# Patient Record
Sex: FEMALE | Race: WHITE | NOT HISPANIC OR LATINO | Employment: FULL TIME | URBAN - METROPOLITAN AREA
[De-identification: names, ages, dates, MRNs, and addresses within clinical notes are randomized per-mention and may not be internally consistent; named-entity substitution may affect disease eponyms.]

---

## 2023-02-01 PROBLEM — M25.562 CHRONIC PAIN OF BOTH KNEES: Status: ACTIVE | Noted: 2023-02-01

## 2023-02-01 PROBLEM — M06.4 POLYARTHRITIS, INFLAMMATORY (MULTI): Status: ACTIVE | Noted: 2023-02-01
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
## 2023-02-01 PROBLEM — H91.93 DECREASED HEARING OF BOTH EARS: Status: ACTIVE | Noted: 2023-02-01

## 2023-02-01 PROBLEM — I10 HYPERTENSION, BENIGN: Status: ACTIVE | Noted: 2023-02-01

## 2023-02-01 PROBLEM — R29.818 SUSPECTED SLEEP APNEA: Status: ACTIVE | Noted: 2023-02-01

## 2023-02-01 PROBLEM — M12.9 ARTHROPATHY: Status: ACTIVE | Noted: 2023-02-01

## 2023-02-01 PROBLEM — E66.813 CLASS 3 SEVERE OBESITY DUE TO EXCESS CALORIES WITH SERIOUS COMORBIDITY AND BODY MASS INDEX (BMI) OF 60.0 TO 69.9 IN ADULT: Status: ACTIVE | Noted: 2023-02-01

## 2023-02-01 PROBLEM — M54.50 LOW BACK PAIN: Status: ACTIVE | Noted: 2023-02-01

## 2023-02-01 PROBLEM — R74.8 ALKALINE PHOSPHATASE ELEVATION: Status: ACTIVE | Noted: 2023-02-01

## 2023-02-01 PROBLEM — G56.01 CARPAL TUNNEL SYNDROME OF RIGHT WRIST: Status: ACTIVE | Noted: 2023-02-01

## 2023-02-01 PROBLEM — M75.102 LEFT ROTATOR CUFF TEAR ARTHROPATHY: Status: ACTIVE | Noted: 2023-02-01

## 2023-02-01 PROBLEM — M79.602 PAIN IN BOTH UPPER EXTREMITIES: Status: ACTIVE | Noted: 2023-02-01

## 2023-02-01 PROBLEM — G89.29 CHRONIC PAIN OF BOTH KNEES: Status: ACTIVE | Noted: 2023-02-01

## 2023-02-01 PROBLEM — M10.9 GOUT: Status: ACTIVE | Noted: 2023-02-01

## 2023-02-01 PROBLEM — K57.30 DIVERTICULOSIS OF COLON: Status: ACTIVE | Noted: 2023-02-01

## 2023-02-01 PROBLEM — M25.569 KNEE PAIN: Status: ACTIVE | Noted: 2023-02-01

## 2023-02-01 PROBLEM — R79.89 LOW SERUM VITAMIN D: Status: ACTIVE | Noted: 2023-02-01

## 2023-02-01 PROBLEM — E66.01 CLASS 3 SEVERE OBESITY DUE TO EXCESS CALORIES WITH SERIOUS COMORBIDITY AND BODY MASS INDEX (BMI) OF 60.0 TO 69.9 IN ADULT (MULTI): Status: ACTIVE | Noted: 2023-02-01

## 2023-02-01 PROBLEM — M79.601 PAIN IN BOTH UPPER EXTREMITIES: Status: ACTIVE | Noted: 2023-02-01

## 2023-02-01 PROBLEM — E78.1 ENDOGENOUS HYPERLIPIDEMIA: Status: ACTIVE | Noted: 2023-02-01

## 2023-02-01 PROBLEM — M06.09 SERONEGATIVE ARTHROPATHY OF MULTIPLE SITES (MULTI): Status: ACTIVE | Noted: 2023-02-01

## 2023-02-01 PROBLEM — M12.812 LEFT ROTATOR CUFF TEAR ARTHROPATHY: Status: ACTIVE | Noted: 2023-02-01

## 2023-02-01 PROBLEM — E66.01 MORBID OBESITY WITH BMI OF 60.0-69.9, ADULT (MULTI): Status: ACTIVE | Noted: 2023-02-01

## 2023-02-01 PROBLEM — G56.02 CARPAL TUNNEL SYNDROME OF LEFT WRIST: Status: ACTIVE | Noted: 2023-02-01

## 2023-02-01 PROBLEM — M25.512 LEFT SHOULDER PAIN: Status: ACTIVE | Noted: 2023-02-01

## 2023-02-01 PROBLEM — E79.0 HYPERURICEMIA: Status: ACTIVE | Noted: 2023-02-01

## 2023-02-01 PROBLEM — K76.0 NAFLD (NONALCOHOLIC FATTY LIVER DISEASE): Status: ACTIVE | Noted: 2023-02-01

## 2023-02-01 PROBLEM — M25.561 CHRONIC PAIN OF BOTH KNEES: Status: ACTIVE | Noted: 2023-02-01

## 2023-02-01 PROBLEM — M17.9 DJD (DEGENERATIVE JOINT DISEASE) OF KNEE: Status: ACTIVE | Noted: 2023-02-01

## 2023-02-01 PROBLEM — E55.9 VITAMIN D DEFICIENCY: Status: ACTIVE | Noted: 2023-02-01

## 2023-02-01 RX ORDER — AMLODIPINE BESYLATE 5 MG/1
1 TABLET ORAL DAILY
COMMUNITY
Start: 2019-08-25 | End: 2023-03-15 | Stop reason: SDUPTHER

## 2023-02-01 RX ORDER — CELECOXIB 200 MG/1
1 CAPSULE ORAL DAILY
COMMUNITY
Start: 2019-08-07 | End: 2023-11-07 | Stop reason: SDUPTHER

## 2023-02-01 RX ORDER — IBUPROFEN 800 MG/1
1 TABLET ORAL 3 TIMES DAILY PRN
COMMUNITY
Start: 2021-09-09 | End: 2023-03-15 | Stop reason: SDUPTHER

## 2023-02-01 RX ORDER — ALLOPURINOL 300 MG/1
1 TABLET ORAL DAILY
COMMUNITY
Start: 2019-10-29 | End: 2023-11-07 | Stop reason: SDUPTHER

## 2023-02-01 RX ORDER — CHOLECALCIFEROL (VITAMIN D3) 25 MCG
1 TABLET ORAL DAILY
COMMUNITY

## 2023-02-01 RX ORDER — HYDROXYCHLOROQUINE SULFATE 200 MG/1
200 TABLET, FILM COATED ORAL 2 TIMES DAILY
COMMUNITY
Start: 2020-02-18 | End: 2023-11-08

## 2023-02-01 RX ORDER — TELMISARTAN AND HYDROCHLORTHIAZIDE 80; 12.5 MG/1; MG/1
1 TABLET ORAL DAILY
COMMUNITY
Start: 2019-08-12 | End: 2023-03-15 | Stop reason: SDUPTHER

## 2023-03-15 ENCOUNTER — LAB (OUTPATIENT)
Dept: LAB | Facility: LAB | Age: 68
End: 2023-03-15
Payer: COMMERCIAL

## 2023-03-15 ENCOUNTER — OFFICE VISIT (OUTPATIENT)
Dept: PRIMARY CARE | Facility: CLINIC | Age: 68
End: 2023-03-15
Payer: COMMERCIAL

## 2023-03-15 VITALS
HEIGHT: 61 IN | DIASTOLIC BLOOD PRESSURE: 90 MMHG | SYSTOLIC BLOOD PRESSURE: 144 MMHG | WEIGHT: 293 LBS | BODY MASS INDEX: 55.32 KG/M2

## 2023-03-15 DIAGNOSIS — I10 HYPERTENSION, BENIGN: ICD-10-CM

## 2023-03-15 DIAGNOSIS — Z00.00 HEALTH CARE MAINTENANCE: ICD-10-CM

## 2023-03-15 DIAGNOSIS — M12.9 ARTHROPATHY: ICD-10-CM

## 2023-03-15 DIAGNOSIS — E83.52 SERUM CALCIUM ELEVATED: Primary | ICD-10-CM

## 2023-03-15 DIAGNOSIS — E83.52 SERUM CALCIUM ELEVATED: ICD-10-CM

## 2023-03-15 LAB
ALANINE AMINOTRANSFERASE (SGPT) (U/L) IN SER/PLAS: 26 U/L (ref 7–45)
ALBUMIN (G/DL) IN SER/PLAS: 4.6 G/DL (ref 3.4–5)
ALKALINE PHOSPHATASE (U/L) IN SER/PLAS: 114 U/L (ref 33–136)
ANION GAP IN SER/PLAS: 16 MMOL/L (ref 10–20)
ASPARTATE AMINOTRANSFERASE (SGOT) (U/L) IN SER/PLAS: 22 U/L (ref 9–39)
BILIRUBIN TOTAL (MG/DL) IN SER/PLAS: 0.7 MG/DL (ref 0–1.2)
CALCIUM (MG/DL) IN SER/PLAS: 10.7 MG/DL (ref 8.6–10.6)
CARBON DIOXIDE, TOTAL (MMOL/L) IN SER/PLAS: 27 MMOL/L (ref 21–32)
CHLORIDE (MMOL/L) IN SER/PLAS: 102 MMOL/L (ref 98–107)
CREATININE (MG/DL) IN SER/PLAS: 0.63 MG/DL (ref 0.5–1.05)
GFR FEMALE: >90 ML/MIN/1.73M2
GLUCOSE (MG/DL) IN SER/PLAS: 87 MG/DL (ref 74–99)
PARATHYRIN INTACT (PG/ML) IN SER/PLAS: 72.3 PG/ML (ref 18.5–88)
POTASSIUM (MMOL/L) IN SER/PLAS: 3.9 MMOL/L (ref 3.5–5.3)
PROTEIN TOTAL: 7.7 G/DL (ref 6.4–8.2)
SODIUM (MMOL/L) IN SER/PLAS: 141 MMOL/L (ref 136–145)
UREA NITROGEN (MG/DL) IN SER/PLAS: 20 MG/DL (ref 6–23)

## 2023-03-15 PROCEDURE — 99214 OFFICE O/P EST MOD 30 MIN: CPT | Performed by: STUDENT IN AN ORGANIZED HEALTH CARE EDUCATION/TRAINING PROGRAM

## 2023-03-15 PROCEDURE — 3080F DIAST BP >= 90 MM HG: CPT | Performed by: STUDENT IN AN ORGANIZED HEALTH CARE EDUCATION/TRAINING PROGRAM

## 2023-03-15 PROCEDURE — 1159F MED LIST DOCD IN RCRD: CPT | Performed by: STUDENT IN AN ORGANIZED HEALTH CARE EDUCATION/TRAINING PROGRAM

## 2023-03-15 PROCEDURE — 36415 COLL VENOUS BLD VENIPUNCTURE: CPT

## 2023-03-15 PROCEDURE — 1036F TOBACCO NON-USER: CPT | Performed by: STUDENT IN AN ORGANIZED HEALTH CARE EDUCATION/TRAINING PROGRAM

## 2023-03-15 PROCEDURE — 83970 ASSAY OF PARATHORMONE: CPT

## 2023-03-15 PROCEDURE — 3077F SYST BP >= 140 MM HG: CPT | Performed by: STUDENT IN AN ORGANIZED HEALTH CARE EDUCATION/TRAINING PROGRAM

## 2023-03-15 PROCEDURE — 1160F RVW MEDS BY RX/DR IN RCRD: CPT | Performed by: STUDENT IN AN ORGANIZED HEALTH CARE EDUCATION/TRAINING PROGRAM

## 2023-03-15 PROCEDURE — 80053 COMPREHEN METABOLIC PANEL: CPT

## 2023-03-15 RX ORDER — IBUPROFEN 800 MG/1
800 TABLET ORAL 3 TIMES DAILY PRN
Qty: 270 TABLET | Refills: 1 | Status: SHIPPED | OUTPATIENT
Start: 2023-03-15 | End: 2023-07-17 | Stop reason: SDUPTHER

## 2023-03-15 RX ORDER — AMLODIPINE BESYLATE 5 MG/1
5 TABLET ORAL DAILY
Qty: 90 TABLET | Refills: 1 | Status: SHIPPED | OUTPATIENT
Start: 2023-03-15 | End: 2023-07-17 | Stop reason: SDUPTHER

## 2023-03-15 RX ORDER — TELMISARTAN AND HYDROCHLORTHIAZIDE 80; 12.5 MG/1; MG/1
1 TABLET ORAL DAILY
Qty: 90 TABLET | Refills: 1 | Status: SHIPPED | OUTPATIENT
Start: 2023-03-15 | End: 2023-07-17 | Stop reason: SDUPTHER

## 2023-03-15 NOTE — PROGRESS NOTES
"Subjective   Patient ID: Marcella Muñoz is a 67 y.o. female who presents for Follow-up (Med refill).    HPI   Routine fu.    She has chronic aches and pains, but otherwise doing OK.    She is back on the keto diet.  Review of Systems  12-point ROS reviewed and was negative unless otherwise noted in HPI.    Objective   Ht 1.549 m (5' 1\")   Wt (!) 152 kg (334 lb)   BMI 63.11 kg/m²     Physical Exam  GEN: conversant, NAD  HEENT: PERRL, EOMI, wearing a mask, TMs clear b/l  NECK: supple, no carotid bruits appreciated b/l  CV: S1, S2, RRR  PULM: CTAB  ABD: soft, obese  NEURO: no new gross focal deficits  EXT: no sig LE edema  PSYCH: appropriate affect    Assessment/Plan     #Elevated serum calcium  -possible 2/2 hydrochlorothiazide. Repeat CMP, check PTH. Consider stopping hydrochlorothiazide if still elevated.    #Seronegative arthritis  #Hyperuricemia without dx of gout  -Continue allopurinol  -Continue Celebrex per rheumatology      #Hypertension  -stable on home reads, continue current meds, refilled     #Morbid obesity  #OA  -Patient has met with nutritionist in the past, she states that she lost 90 pounds on the keto diet, is now back on this diet  -Encourage lifestyle modifications, previously offered referral to aquatic therapy/physical therapy and nutritionist, but she declined   -may benefit from bariatric surgery for weight and to help with OA      #Obstructive sleep apnea, suspected  -Patient has multiple risk factors with regards to sleep apnea she also endorses that she snores at night  -She was offered sleep referral previously, but she declined      #ROD  -denied any complications, has never seen GI      #Health maintenance  -She follows with OB/GYN up-to-date on mammograms  -DEXA screening ordered  -follows with eye doctor annually   -Colonoscopy due, this was ordered  -flu shot UTD 2022  -up-to-date on shingles vaccine  -PPSV23 given 2022     Return to clinic in 4 months      "

## 2023-05-09 LAB
CALCIDIOL (25 OH VITAMIN D3) (NG/ML) IN SER/PLAS: 43 NG/ML
URATE (MG/DL) IN SER/PLAS: 4.1 MG/DL (ref 2.3–6.7)

## 2023-05-10 LAB — POC CALCIUM IONIZED (MMOL/L) IN BLOOD: 1.2 MMOL/L (ref 1.1–1.33)

## 2023-07-17 ENCOUNTER — OFFICE VISIT (OUTPATIENT)
Dept: PRIMARY CARE | Facility: CLINIC | Age: 68
End: 2023-07-17
Payer: COMMERCIAL

## 2023-07-17 VITALS — DIASTOLIC BLOOD PRESSURE: 94 MMHG | SYSTOLIC BLOOD PRESSURE: 152 MMHG

## 2023-07-17 DIAGNOSIS — I10 HYPERTENSION, BENIGN: ICD-10-CM

## 2023-07-17 DIAGNOSIS — M12.9 ARTHROPATHY: ICD-10-CM

## 2023-07-17 DIAGNOSIS — E83.52 SERUM CALCIUM ELEVATED: Primary | ICD-10-CM

## 2023-07-17 DIAGNOSIS — E66.01 CLASS 3 SEVERE OBESITY DUE TO EXCESS CALORIES WITH SERIOUS COMORBIDITY AND BODY MASS INDEX (BMI) OF 60.0 TO 69.9 IN ADULT (MULTI): ICD-10-CM

## 2023-07-17 DIAGNOSIS — M06.09 SERONEGATIVE ARTHROPATHY OF MULTIPLE SITES (MULTI): ICD-10-CM

## 2023-07-17 DIAGNOSIS — E78.1 ENDOGENOUS HYPERLIPIDEMIA: ICD-10-CM

## 2023-07-17 PROCEDURE — 1036F TOBACCO NON-USER: CPT | Performed by: STUDENT IN AN ORGANIZED HEALTH CARE EDUCATION/TRAINING PROGRAM

## 2023-07-17 PROCEDURE — 3080F DIAST BP >= 90 MM HG: CPT | Performed by: STUDENT IN AN ORGANIZED HEALTH CARE EDUCATION/TRAINING PROGRAM

## 2023-07-17 PROCEDURE — 99213 OFFICE O/P EST LOW 20 MIN: CPT | Performed by: STUDENT IN AN ORGANIZED HEALTH CARE EDUCATION/TRAINING PROGRAM

## 2023-07-17 PROCEDURE — 1159F MED LIST DOCD IN RCRD: CPT | Performed by: STUDENT IN AN ORGANIZED HEALTH CARE EDUCATION/TRAINING PROGRAM

## 2023-07-17 PROCEDURE — 3008F BODY MASS INDEX DOCD: CPT | Performed by: STUDENT IN AN ORGANIZED HEALTH CARE EDUCATION/TRAINING PROGRAM

## 2023-07-17 PROCEDURE — 3077F SYST BP >= 140 MM HG: CPT | Performed by: STUDENT IN AN ORGANIZED HEALTH CARE EDUCATION/TRAINING PROGRAM

## 2023-07-17 PROCEDURE — 1160F RVW MEDS BY RX/DR IN RCRD: CPT | Performed by: STUDENT IN AN ORGANIZED HEALTH CARE EDUCATION/TRAINING PROGRAM

## 2023-07-17 RX ORDER — AMLODIPINE BESYLATE 5 MG/1
5 TABLET ORAL DAILY
Qty: 90 TABLET | Refills: 1 | Status: SHIPPED | OUTPATIENT
Start: 2023-07-17 | End: 2024-01-22 | Stop reason: SDUPTHER

## 2023-07-17 RX ORDER — IBUPROFEN 800 MG/1
800 TABLET ORAL 3 TIMES DAILY PRN
Qty: 270 TABLET | Refills: 1 | Status: SHIPPED | OUTPATIENT
Start: 2023-07-17 | End: 2024-01-22 | Stop reason: ALTCHOICE

## 2023-07-17 RX ORDER — TELMISARTAN AND HYDROCHLORTHIAZIDE 80; 12.5 MG/1; MG/1
1 TABLET ORAL DAILY
Qty: 90 TABLET | Refills: 1 | Status: SHIPPED | OUTPATIENT
Start: 2023-07-17 | End: 2024-01-22 | Stop reason: SDUPTHER

## 2023-07-17 NOTE — PROGRESS NOTES
Subjective   Patient ID: Marcella Muñoz is a 67 y.o. female who presents for Follow-up (Med refill).    HPI   Routine fu.  She has been having LBP and sciatica. She has an upcoming appointment with pain management.  Review of Systems  12-point ROS reviewed and was negative unless otherwise noted in HPI.    Objective   BP (!) 152/94     Physical Exam  GEN: conversant, NAD  HEENT: PERRL, EOMI, MMM  NECK: supple  CV: S1, S2, RRR  PULM: CTAB  ABD: obese  NEURO: no new gross focal deficits  EXT: no sig LE edema  PSYCH: appropriate affect    Assessment/Plan     #Chronic LBP/sciatica: She has an upcoming appointment with pain management. Offered PT referral, patient declines for now.    #Elevated serum calcium, monitor  -possible 2/2 hydrochlorothiazide. Low threshold for endocrine eval     #Seronegative arthritis  #Hyperuricemia without dx of gout  -Continue allopurinol  -Continue Celebrex per rheumatology      #Hypertension  -stable on home reads, continue current meds, refilled     #Morbid obesity  #OA  -Patient has met with nutritionist in the past, she states that she lost 90 pounds on the keto diet, is now back on this diet  -Encourage lifestyle modifications, previously offered referral to aquatic therapy/physical therapy and nutritionist, but she declined   -may benefit from bariatric surgery for weight and to help with OA      #Obstructive sleep apnea, suspected  -Patient has multiple risk factors with regards to sleep apnea she also endorses that she snores at night  -She was offered sleep referral previously, but she declined      #ROD  -denied any complications, has never seen GI      #Health maintenance  -She follows with OB/GYN up-to-date on mammograms  -DEXA screening ordered  -follows with eye doctor annually   -Colonoscopy done in 2023, follows with Dr. Alonso  -flu shot UTD 2022  -up-to-date on shingles vaccine  -PPSV23 given 2022     Return to clinic in 6 months

## 2023-08-07 ENCOUNTER — HOSPITAL ENCOUNTER (OUTPATIENT)
Dept: DATA CONVERSION | Facility: HOSPITAL | Age: 68
End: 2023-08-07
Attending: ANESTHESIOLOGY | Admitting: ANESTHESIOLOGY
Payer: COMMERCIAL

## 2023-08-07 DIAGNOSIS — M25.569 PAIN IN UNSPECIFIED KNEE: ICD-10-CM

## 2023-08-07 DIAGNOSIS — M96.1 POSTLAMINECTOMY SYNDROME, NOT ELSEWHERE CLASSIFIED: ICD-10-CM

## 2023-09-26 ENCOUNTER — TRANSCRIBE ORDERS (OUTPATIENT)
Dept: PAIN MEDICINE | Facility: CLINIC | Age: 68
End: 2023-09-26
Payer: COMMERCIAL

## 2023-09-29 ENCOUNTER — TRANSCRIBE ORDERS (OUTPATIENT)
Dept: PAIN MEDICINE | Facility: CLINIC | Age: 68
End: 2023-09-29
Payer: COMMERCIAL

## 2023-09-29 VITALS — BODY MASS INDEX: 63.08 KG/M2 | HEIGHT: 60 IN

## 2023-09-29 DIAGNOSIS — M17.12 OSTEOARTHRITIS OF LEFT KNEE, UNSPECIFIED OSTEOARTHRITIS TYPE: ICD-10-CM

## 2023-10-03 ENCOUNTER — ANCILLARY PROCEDURE (OUTPATIENT)
Dept: RADIOLOGY | Facility: CLINIC | Age: 68
End: 2023-10-03
Payer: COMMERCIAL

## 2023-10-03 ENCOUNTER — CLINICAL SUPPORT (OUTPATIENT)
Dept: PAIN MEDICINE | Facility: CLINIC | Age: 68
End: 2023-10-03
Payer: COMMERCIAL

## 2023-10-03 VITALS
RESPIRATION RATE: 18 BRPM | HEART RATE: 74 BPM | TEMPERATURE: 96.8 F | BODY MASS INDEX: 61.22 KG/M2 | OXYGEN SATURATION: 98 % | HEIGHT: 61 IN

## 2023-10-03 DIAGNOSIS — M17.12 PRIMARY OSTEOARTHRITIS OF LEFT KNEE: ICD-10-CM

## 2023-10-03 PROCEDURE — 2500000004 HC RX 250 GENERAL PHARMACY W/ HCPCS (ALT 636 FOR OP/ED): Mod: JZ

## 2023-10-03 PROCEDURE — 20610 DRAIN/INJ JOINT/BURSA W/O US: CPT | Performed by: ANESTHESIOLOGY

## 2023-10-03 PROCEDURE — 77003 FLUOROGUIDE FOR SPINE INJECT: CPT

## 2023-10-03 PROCEDURE — 77002 NEEDLE LOCALIZATION BY XRAY: CPT | Performed by: ANESTHESIOLOGY

## 2023-10-03 PROCEDURE — 2500000005 HC RX 250 GENERAL PHARMACY W/O HCPCS

## 2023-10-03 RX ORDER — BUPIVACAINE HYDROCHLORIDE 7.5 MG/ML
INJECTION, SOLUTION EPIDURAL; RETROBULBAR
Status: COMPLETED
Start: 2023-10-03 | End: 2023-10-03

## 2023-10-03 RX ORDER — LIDOCAINE HYDROCHLORIDE 5 MG/ML
INJECTION, SOLUTION INFILTRATION; INTRAVENOUS
Status: COMPLETED
Start: 2023-10-03 | End: 2023-10-03

## 2023-10-03 RX ORDER — CHOLESTYRAMINE 4 G/9G
POWDER, FOR SUSPENSION ORAL
COMMUNITY
Start: 2023-04-05 | End: 2024-01-18 | Stop reason: ALTCHOICE

## 2023-10-03 RX ADMIN — BUPIVACAINE HYDROCHLORIDE 75 MG: 7.5 INJECTION, SOLUTION EPIDURAL; RETROBULBAR at 13:50

## 2023-10-03 RX ADMIN — LIDOCAINE HYDROCHLORIDE 250 MG: 5 INJECTION, SOLUTION INFILTRATION at 13:50

## 2023-10-03 RX ADMIN — TRIAMCINOLONE ACETONIDE EXTENDED-RELEASE INJECTABLE SUSPENSION 32 MG: KIT INTRA-ARTICULAR at 13:50

## 2023-10-03 SDOH — ECONOMIC STABILITY: FOOD INSECURITY: WITHIN THE PAST 12 MONTHS, YOU WORRIED THAT YOUR FOOD WOULD RUN OUT BEFORE YOU GOT MONEY TO BUY MORE.: NEVER TRUE

## 2023-10-03 SDOH — ECONOMIC STABILITY: FOOD INSECURITY: WITHIN THE PAST 12 MONTHS, THE FOOD YOU BOUGHT JUST DIDN'T LAST AND YOU DIDN'T HAVE MONEY TO GET MORE.: NEVER TRUE

## 2023-10-03 ASSESSMENT — PATIENT HEALTH QUESTIONNAIRE - PHQ9
2. FEELING DOWN, DEPRESSED OR HOPELESS: NOT AT ALL
SUM OF ALL RESPONSES TO PHQ9 QUESTIONS 1 & 2: 0
1. LITTLE INTEREST OR PLEASURE IN DOING THINGS: NOT AT ALL

## 2023-10-03 ASSESSMENT — PAIN SCALES - GENERAL
PAINLEVEL: 8
PAINLEVEL_OUTOF10: 8
PAINLEVEL_OUTOF10: 0 - NO PAIN

## 2023-10-03 ASSESSMENT — LIFESTYLE VARIABLES
HOW OFTEN DO YOU HAVE SIX OR MORE DRINKS ON ONE OCCASION: NEVER
HOW OFTEN DO YOU HAVE A DRINK CONTAINING ALCOHOL: MONTHLY OR LESS
HOW MANY STANDARD DRINKS CONTAINING ALCOHOL DO YOU HAVE ON A TYPICAL DAY: 1 OR 2
AUDIT-C TOTAL SCORE: 1
SKIP TO QUESTIONS 9-10: 1

## 2023-10-03 ASSESSMENT — COLUMBIA-SUICIDE SEVERITY RATING SCALE - C-SSRS
1. IN THE PAST MONTH, HAVE YOU WISHED YOU WERE DEAD OR WISHED YOU COULD GO TO SLEEP AND NOT WAKE UP?: NO
6. HAVE YOU EVER DONE ANYTHING, STARTED TO DO ANYTHING, OR PREPARED TO DO ANYTHING TO END YOUR LIFE?: NO
2. HAVE YOU ACTUALLY HAD ANY THOUGHTS OF KILLING YOURSELF?: NO

## 2023-10-03 ASSESSMENT — ENCOUNTER SYMPTOMS
DEPRESSION: 0
OCCASIONAL FEELINGS OF UNSTEADINESS: 0
LOSS OF SENSATION IN FEET: 0

## 2023-10-03 ASSESSMENT — PAIN DESCRIPTION - DESCRIPTORS: DESCRIPTORS: ACHING;TIGHTNESS

## 2023-10-03 ASSESSMENT — PAIN - FUNCTIONAL ASSESSMENT: PAIN_FUNCTIONAL_ASSESSMENT: 0-10

## 2023-10-03 NOTE — PROGRESS NOTES
Patient ID: Marcella Muñoz is a 67 y.o. female.    Joint Injection/Aspiration    Date/Time: 10/3/2023 1:14 PM    Performed by: Shannan Brown MD  Authorized by: Shannan Brown MD

## 2023-10-03 NOTE — PROGRESS NOTES
Preop diagnosis: Primary knee osteoarthritis  Postoperative diagnosis: The same    Procedure: Left knee Zilretta 32 mg,   injection under fluoroscopy guidance.    Ready to learn, no apparent learning barriers.  Explained treatment plan. Pt. verbalized understanding. Following review of potential side effects and complications (including but not necessarily limited to infection, allergic reaction, local tissue breakdown, injury to bodily tissues. The patient expressed understanding of this risk and wishes to proceed with the elective procedure.    The patient was brought to the operating room and placed in the supine position with pillows underneath the knees..The procedure was carried out under sterile prep with Chlora-prep.  Under fluoroscopy guidance the skin was infiltrated with lidocaine 1% using 25 ga 1.5 inche  needle and size 18-gauge 1.5 inch needle was introduced and advanced into the lateral/caudal area of the patella into the ligament then the knee joint and after negative aspiration 0.5 cc of Omnipaque 300 was injected and showed excellent distribution of the dye into the joint cavity.  Next Zilretta 32 mg + 2 cc of 0.75 bupivacaine were injected into the joint.  The needle was flushed and removed.         Patient tolerated the procedure very well and discharged to the recovery room in stable condition.

## 2023-10-03 NOTE — PATIENT INSTRUCTIONS
Post injection instructions and education provided. Pt verbalizes understanding. Pt states that current pain is 0 .  Pt discharged with  .

## 2023-10-03 NOTE — PROGRESS NOTES
Patient signed electronic consent    In room: 1346  Patient placed Supine position  Time out : 1347  Prep and drapped 1347  Procedure start:1348  Patient tolerating procedure left knee injection   Patient heart rate 75  Pulse ox 97  Respiratory Rate 18  Contrast.1ml.   Imaged completed .   Procedure end: 1350  Debriefing :1350  Band aid applied .  Out of room: 1353

## 2023-10-25 ENCOUNTER — TELEPHONE (OUTPATIENT)
Dept: PAIN MEDICINE | Facility: CLINIC | Age: 68
End: 2023-10-25
Payer: COMMERCIAL

## 2023-10-25 DIAGNOSIS — M16.10 HIP ARTHRITIS: Primary | ICD-10-CM

## 2023-10-25 DIAGNOSIS — M06.4 POLYARTHRITIS, INFLAMMATORY (MULTI): Primary | ICD-10-CM

## 2023-10-25 NOTE — TELEPHONE ENCOUNTER
----- Message from Cortez Ward MA sent at 10/25/2023  9:05 AM EDT -----  Regarding: FW: GLENDYM TO SCHED AN INJECTION  Contact: [phone]    ----- Message -----  From: Alyse Tucker  Sent: 10/24/2023   6:45 PM EDT  To: Shea Mathews; Paz Rojas; #  Subject: LVM TO SCHED AN INJECTION                        Hi!    This patient left a  looking to schedule an Injection with Dr. Brown. A good call back number has been attached. Thanks!    -Alyse

## 2023-10-25 NOTE — TELEPHONE ENCOUNTER
----- Message from Cortez Ward MA sent at 10/25/2023  1:07 PM EDT -----  Regarding: FW: JOSUE TO SCHED AN INJECTION  Contact: [phone]  Spoke to pt and she wants shoulder. Please put in new order for shoulder  ----- Message -----  From: Shannan Brown MD  Sent: 10/25/2023  12:48 PM EDT  To: Cortez Ward MA  Subject: RE: LVM TO SCHED AN INJECTION                    Right hip arthritis under fluoroscopy guidance lateral approach order placed  ----- Message -----  From: Cortez Ward MA  Sent: 10/25/2023   9:05 AM EDT  To: Shannan Brown MD  Subject: FW: JOSUE TO SCHED AN INJECTION                      ----- Message -----  From: Alyse Tucker  Sent: 10/24/2023   6:45 PM EDT  To: Shea Mathews; Paz Rojas; #  Subject: LVM TO SCHED AN INJECTION                        Hi!    This patient left a  looking to schedule an Injection with Dr. Brown. A good call back number has been attached. Thanks!    -Alyse

## 2023-10-30 ENCOUNTER — TELEPHONE (OUTPATIENT)
Dept: PAIN MEDICINE | Facility: CLINIC | Age: 68
End: 2023-10-30
Payer: COMMERCIAL

## 2023-10-30 DIAGNOSIS — M25.512 CHRONIC LEFT SHOULDER PAIN: Primary | ICD-10-CM

## 2023-10-30 DIAGNOSIS — G89.29 CHRONIC LEFT SHOULDER PAIN: Primary | ICD-10-CM

## 2023-10-30 NOTE — TELEPHONE ENCOUNTER
Patient left  over the weekend waiting to have the orders placed to put in for a shoulder injection.  She's wanting a call back as soon as possible.

## 2023-11-07 ENCOUNTER — OFFICE VISIT (OUTPATIENT)
Dept: RHEUMATOLOGY | Facility: CLINIC | Age: 68
End: 2023-11-07
Payer: COMMERCIAL

## 2023-11-07 ENCOUNTER — LAB (OUTPATIENT)
Dept: LAB | Facility: LAB | Age: 68
End: 2023-11-07
Payer: COMMERCIAL

## 2023-11-07 VITALS
SYSTOLIC BLOOD PRESSURE: 137 MMHG | HEART RATE: 72 BPM | HEIGHT: 61 IN | BODY MASS INDEX: 55.32 KG/M2 | WEIGHT: 293 LBS | DIASTOLIC BLOOD PRESSURE: 82 MMHG

## 2023-11-07 DIAGNOSIS — M06.09 SERONEGATIVE ARTHROPATHY OF MULTIPLE SITES (MULTI): Primary | ICD-10-CM

## 2023-11-07 DIAGNOSIS — M06.09 SERONEGATIVE ARTHROPATHY OF MULTIPLE SITES (MULTI): ICD-10-CM

## 2023-11-07 DIAGNOSIS — M1A.9XX0 CHRONIC GOUT WITHOUT TOPHUS, UNSPECIFIED CAUSE, UNSPECIFIED SITE: ICD-10-CM

## 2023-11-07 LAB
25(OH)D3 SERPL-MCNC: 36 NG/ML (ref 30–100)
ANION GAP SERPL CALC-SCNC: 15 MMOL/L (ref 10–20)
BASOPHILS # BLD AUTO: 0.08 X10*3/UL (ref 0–0.1)
BASOPHILS NFR BLD AUTO: 1.2 %
BUN SERPL-MCNC: 21 MG/DL (ref 6–23)
CALCIUM SERPL-MCNC: 10.3 MG/DL (ref 8.6–10.6)
CHLORIDE SERPL-SCNC: 104 MMOL/L (ref 98–107)
CO2 SERPL-SCNC: 29 MMOL/L (ref 21–32)
CREAT SERPL-MCNC: 0.7 MG/DL (ref 0.5–1.05)
EOSINOPHIL # BLD AUTO: 0.16 X10*3/UL (ref 0–0.7)
EOSINOPHIL NFR BLD AUTO: 2.4 %
ERYTHROCYTE [DISTWIDTH] IN BLOOD BY AUTOMATED COUNT: 13.5 % (ref 11.5–14.5)
GFR SERPL CREATININE-BSD FRML MDRD: >90 ML/MIN/1.73M*2
GLUCOSE SERPL-MCNC: 80 MG/DL (ref 74–99)
HCT VFR BLD AUTO: 44.7 % (ref 36–46)
HGB BLD-MCNC: 14.8 G/DL (ref 12–16)
IMM GRANULOCYTES # BLD AUTO: 0.03 X10*3/UL (ref 0–0.7)
IMM GRANULOCYTES NFR BLD AUTO: 0.4 % (ref 0–0.9)
LYMPHOCYTES # BLD AUTO: 2.02 X10*3/UL (ref 1.2–4.8)
LYMPHOCYTES NFR BLD AUTO: 30 %
MCH RBC QN AUTO: 29.5 PG (ref 26–34)
MCHC RBC AUTO-ENTMCNC: 33.1 G/DL (ref 32–36)
MCV RBC AUTO: 89 FL (ref 80–100)
MONOCYTES # BLD AUTO: 0.54 X10*3/UL (ref 0.1–1)
MONOCYTES NFR BLD AUTO: 8 %
NEUTROPHILS # BLD AUTO: 3.91 X10*3/UL (ref 1.2–7.7)
NEUTROPHILS NFR BLD AUTO: 58 %
NRBC BLD-RTO: 0 /100 WBCS (ref 0–0)
PLATELET # BLD AUTO: 291 X10*3/UL (ref 150–450)
POTASSIUM SERPL-SCNC: 4.7 MMOL/L (ref 3.5–5.3)
RBC # BLD AUTO: 5.01 X10*6/UL (ref 4–5.2)
SODIUM SERPL-SCNC: 143 MMOL/L (ref 136–145)
URATE SERPL-MCNC: 3.9 MG/DL (ref 2.3–6.7)
WBC # BLD AUTO: 6.7 X10*3/UL (ref 4.4–11.3)

## 2023-11-07 PROCEDURE — 3075F SYST BP GE 130 - 139MM HG: CPT | Performed by: INTERNAL MEDICINE

## 2023-11-07 PROCEDURE — 1125F AMNT PAIN NOTED PAIN PRSNT: CPT | Performed by: INTERNAL MEDICINE

## 2023-11-07 PROCEDURE — 84550 ASSAY OF BLOOD/URIC ACID: CPT

## 2023-11-07 PROCEDURE — 80048 BASIC METABOLIC PNL TOTAL CA: CPT

## 2023-11-07 PROCEDURE — 36415 COLL VENOUS BLD VENIPUNCTURE: CPT

## 2023-11-07 PROCEDURE — 1036F TOBACCO NON-USER: CPT | Performed by: INTERNAL MEDICINE

## 2023-11-07 PROCEDURE — 1160F RVW MEDS BY RX/DR IN RCRD: CPT | Performed by: INTERNAL MEDICINE

## 2023-11-07 PROCEDURE — 3008F BODY MASS INDEX DOCD: CPT | Performed by: INTERNAL MEDICINE

## 2023-11-07 PROCEDURE — 99214 OFFICE O/P EST MOD 30 MIN: CPT | Performed by: INTERNAL MEDICINE

## 2023-11-07 PROCEDURE — 85025 COMPLETE CBC W/AUTO DIFF WBC: CPT

## 2023-11-07 PROCEDURE — 82306 VITAMIN D 25 HYDROXY: CPT

## 2023-11-07 PROCEDURE — 3079F DIAST BP 80-89 MM HG: CPT | Performed by: INTERNAL MEDICINE

## 2023-11-07 PROCEDURE — 1159F MED LIST DOCD IN RCRD: CPT | Performed by: INTERNAL MEDICINE

## 2023-11-07 RX ORDER — ALLOPURINOL 300 MG/1
300 TABLET ORAL DAILY
Qty: 90 TABLET | Refills: 1 | Status: SHIPPED | OUTPATIENT
Start: 2023-11-07 | End: 2024-05-28

## 2023-11-07 RX ORDER — CELECOXIB 200 MG/1
200 CAPSULE ORAL DAILY
Qty: 90 CAPSULE | Refills: 1 | Status: SHIPPED | OUTPATIENT
Start: 2023-11-07 | End: 2023-12-04

## 2023-11-07 NOTE — PROGRESS NOTES
Follow-up Rheumatology Patient Visit    Chief Complaint:  Marcella Muñoz is a 68 y.o. female presenting today for Follow-up (Refill needed for allopurinol, plaquenil).    History of Presenting Problem:   67 y/o female with Gout and seronegative Arthropathy present for f/u.  She is currently on Celebrex 200 mg and allopurinol 300 mg daily.   She report she has tried the hyaluronic injection x 3 for her knee but without improvement.   Knee replacement is the next option but she would have to lose weight.  Today she she has no acute joint complaint.  She has seen pain management has been taking Celebrex 100 mg twice a day however she reports no difference with taking once a day  Problem List:   Patient Active Problem List   Diagnosis    Alkaline phosphatase elevation    Arthropathy    Carpal tunnel syndrome of left wrist    Carpal tunnel syndrome of right wrist    Chronic pain of both knees    Decreased hearing of both ears    Diverticulosis of colon    DJD (degenerative joint disease) of knee    Endogenous hyperlipidemia    Fall    Gout    Hyperuricemia    Hypertension, benign    Knee pain    Left rotator cuff tear arthropathy    Left shoulder pain    Low back pain    Low serum vitamin D    NAFLD (nonalcoholic fatty liver disease)    Pain in both upper extremities    Polyarthritis, inflammatory (CMS/HCC)    Seronegative arthropathy of multiple sites (CMS/HCC)    Suspected sleep apnea    Vitamin D deficiency    Class 3 severe obesity due to excess calories with serious comorbidity and body mass index (BMI) of 60.0 to 69.9 in adult (CMS/HCC)    Morbid obesity with BMI of 60.0-69.9, adult (CMS/HCC)       Past Medical History:   Past Medical History:   Diagnosis Date    Personal history of other diseases of the circulatory system     History of hypertension    Personal history of pneumonia (recurrent)     History of pneumonia       Surgical History:   Past Surgical History:   Procedure Laterality Date    OTHER  SURGICAL HISTORY  08/07/2019    Hernia repair    OTHER SURGICAL HISTORY  08/07/2019    Vaginal sling procedure    OTHER SURGICAL HISTORY  08/29/2019    Carpal tunnel surgery    OTHER SURGICAL HISTORY  06/09/2019    Colonoscopy    OTHER SURGICAL HISTORY  06/09/2019    Umbilical hernia repair    OTHER SURGICAL HISTORY  06/09/2019    Tubal ligation bilateral    OTHER SURGICAL HISTORY  06/09/2019    Partial cystectomy    OTHER SURGICAL HISTORY  06/09/2019    Laparoscopic right hemicolectomy    OTHER SURGICAL HISTORY  06/09/2019    Urethropexy        Allergies: No Known Allergies    Medications:   Current Outpatient Medications:     allopurinol (Zyloprim) 300 mg tablet, Take 1 tablet (300 mg) by mouth once daily., Disp: , Rfl:     amLODIPine (Norvasc) 5 mg tablet, Take 1 tablet (5 mg) by mouth once daily., Disp: 90 tablet, Rfl: 1    cholecalciferol (Vitamin D-3) 25 MCG (1000 UT) tablet, Take 1 tablet (25 mcg) by mouth once daily., Disp: , Rfl:     cholestyramine (Questran) 4 gram packet, Take by mouth., Disp: , Rfl:     hydroxychloroquine (Plaquenil) 200 mg tablet, Take 1 tablet (200 mg) by mouth 2 times a day., Disp: , Rfl:     ibuprofen 800 mg tablet, Take 1 tablet (800 mg) by mouth 3 times a day as needed for mild pain (1 - 3)., Disp: 270 tablet, Rfl: 1    KRILL OIL ORAL, Take by mouth., Disp: , Rfl:     omega-3/dha/epa/fish oil (OMEGA-3 ORAL), Take by mouth. Take as directed, Disp: , Rfl:     telmisartan-hydrochlorothiazid (MIcarDIS HCT) 80-12.5 mg tablet, Take 1 tablet by mouth once daily., Disp: 90 tablet, Rfl: 1    vitamin E acetate (VITAMIN E ORAL), Take 1 tablet by mouth 1 (one) time each day., Disp: , Rfl:     celecoxib (CeleBREX) 200 mg capsule, Take 1 capsule (200 mg) by mouth once daily., Disp: 90 capsule, Rfl: 1    Current Facility-Administered Medications:     triamcinolone acetonide (Zilretta) injection 32 mg, 32 mg, intra-articular, Once, Shannan Brown MD      Objective   Physical Examination:    Visit  "Vitals  /82   Pulse 72   Ht 1.549 m (5' 1\")   Wt (!) 152 kg (335 lb 3.2 oz)   BMI 63.34 kg/m²   Smoking Status Never   BSA 2.56 m²        Gen: NAD, A&O x 3  HEENT: clear sclera and conjunctiva,     Musculoskeletal:   Neck; WNL, full ROM  Shoulder: WNL, full ROM  Elbow:WNL, full ROM, no effusion noted  Wrist and fingers;no active synovitis noted, Full ROM in the Wrist , Good fist and   Knees:  No effusions or crepitation, full ROM.  Hips; WNL, full ROM, Negative Maycol test  Ankle, Feet; WNL, full ROM    Skin: No rashes or lesions seen, no nail changes  Neuro: A&O x3, Normal Gait    Procedures :None    Orders:  Orders Placed This Encounter   Procedures    Basic Metabolic Panel    CBC and Auto Differential    Vitamin D 25-Hydroxy,Total (for eval of Vitamin D levels)    Uric Acid        Provider Impression:   Assessment/Plan   Encounter Diagnoses   Name Primary?    Seronegative arthropathy of multiple sites (CMS/[facility]) Yes    Chronic gout without tophus, unspecified cause, unspecified site         68-year-old female with Gout and seronegative arthropathy. She does have chronic lower back pain and chronic knee pain. X-ray in the past show severe knee degenerative arthritis worse in the medial area  -Continue with Celebrex 200 mg once daily, use Tylenol as needed  -Continue Plaquenil 200 mg , she is only able to tolerate 1 tab. discussed sulfasalazine if patient has additional joint issues, patient satisfied with current regimen  -Continue with pain management for lower back pain and sciatica  -Continue allopurinol 300 mg daily   -Follow-up in 6 months    "

## 2023-11-07 NOTE — H&P (VIEW-ONLY)
Follow-up Rheumatology Patient Visit    Chief Complaint:  Marcella Muñoz is a 68 y.o. female presenting today for Follow-up (Refill needed for allopurinol, plaquenil).    History of Presenting Problem:   67 y/o female with Gout and seronegative Arthropathy present for f/u.  She is currently on Celebrex 200 mg and allopurinol 300 mg daily.   She report she has tried the hyaluronic injection x 3 for her knee but without improvement.   Knee replacement is the next option but she would have to lose weight.  Today she she has no acute joint complaint.  She has seen pain management has been taking Celebrex 100 mg twice a day however she reports no difference with taking once a day  Problem List:   Patient Active Problem List   Diagnosis    Alkaline phosphatase elevation    Arthropathy    Carpal tunnel syndrome of left wrist    Carpal tunnel syndrome of right wrist    Chronic pain of both knees    Decreased hearing of both ears    Diverticulosis of colon    DJD (degenerative joint disease) of knee    Endogenous hyperlipidemia    Fall    Gout    Hyperuricemia    Hypertension, benign    Knee pain    Left rotator cuff tear arthropathy    Left shoulder pain    Low back pain    Low serum vitamin D    NAFLD (nonalcoholic fatty liver disease)    Pain in both upper extremities    Polyarthritis, inflammatory (CMS/HCC)    Seronegative arthropathy of multiple sites (CMS/HCC)    Suspected sleep apnea    Vitamin D deficiency    Class 3 severe obesity due to excess calories with serious comorbidity and body mass index (BMI) of 60.0 to 69.9 in adult (CMS/HCC)    Morbid obesity with BMI of 60.0-69.9, adult (CMS/HCC)       Past Medical History:   Past Medical History:   Diagnosis Date    Personal history of other diseases of the circulatory system     History of hypertension    Personal history of pneumonia (recurrent)     History of pneumonia       Surgical History:   Past Surgical History:   Procedure Laterality Date    OTHER  SURGICAL HISTORY  08/07/2019    Hernia repair    OTHER SURGICAL HISTORY  08/07/2019    Vaginal sling procedure    OTHER SURGICAL HISTORY  08/29/2019    Carpal tunnel surgery    OTHER SURGICAL HISTORY  06/09/2019    Colonoscopy    OTHER SURGICAL HISTORY  06/09/2019    Umbilical hernia repair    OTHER SURGICAL HISTORY  06/09/2019    Tubal ligation bilateral    OTHER SURGICAL HISTORY  06/09/2019    Partial cystectomy    OTHER SURGICAL HISTORY  06/09/2019    Laparoscopic right hemicolectomy    OTHER SURGICAL HISTORY  06/09/2019    Urethropexy        Allergies: No Known Allergies    Medications:   Current Outpatient Medications:     allopurinol (Zyloprim) 300 mg tablet, Take 1 tablet (300 mg) by mouth once daily., Disp: , Rfl:     amLODIPine (Norvasc) 5 mg tablet, Take 1 tablet (5 mg) by mouth once daily., Disp: 90 tablet, Rfl: 1    cholecalciferol (Vitamin D-3) 25 MCG (1000 UT) tablet, Take 1 tablet (25 mcg) by mouth once daily., Disp: , Rfl:     cholestyramine (Questran) 4 gram packet, Take by mouth., Disp: , Rfl:     hydroxychloroquine (Plaquenil) 200 mg tablet, Take 1 tablet (200 mg) by mouth 2 times a day., Disp: , Rfl:     ibuprofen 800 mg tablet, Take 1 tablet (800 mg) by mouth 3 times a day as needed for mild pain (1 - 3)., Disp: 270 tablet, Rfl: 1    KRILL OIL ORAL, Take by mouth., Disp: , Rfl:     omega-3/dha/epa/fish oil (OMEGA-3 ORAL), Take by mouth. Take as directed, Disp: , Rfl:     telmisartan-hydrochlorothiazid (MIcarDIS HCT) 80-12.5 mg tablet, Take 1 tablet by mouth once daily., Disp: 90 tablet, Rfl: 1    vitamin E acetate (VITAMIN E ORAL), Take 1 tablet by mouth 1 (one) time each day., Disp: , Rfl:     celecoxib (CeleBREX) 200 mg capsule, Take 1 capsule (200 mg) by mouth once daily., Disp: 90 capsule, Rfl: 1    Current Facility-Administered Medications:     triamcinolone acetonide (Zilretta) injection 32 mg, 32 mg, intra-articular, Once, Shannan Brown MD      Objective   Physical Examination:    Visit  "Vitals  /82   Pulse 72   Ht 1.549 m (5' 1\")   Wt (!) 152 kg (335 lb 3.2 oz)   BMI 63.34 kg/m²   Smoking Status Never   BSA 2.56 m²        Gen: NAD, A&O x 3  HEENT: clear sclera and conjunctiva,     Musculoskeletal:   Neck; WNL, full ROM  Shoulder: WNL, full ROM  Elbow:WNL, full ROM, no effusion noted  Wrist and fingers;no active synovitis noted, Full ROM in the Wrist , Good fist and   Knees:  No effusions or crepitation, full ROM.  Hips; WNL, full ROM, Negative Maycol test  Ankle, Feet; WNL, full ROM    Skin: No rashes or lesions seen, no nail changes  Neuro: A&O x3, Normal Gait    Procedures :None    Orders:  Orders Placed This Encounter   Procedures    Basic Metabolic Panel    CBC and Auto Differential    Vitamin D 25-Hydroxy,Total (for eval of Vitamin D levels)    Uric Acid        Provider Impression:   Assessment/Plan   Encounter Diagnoses   Name Primary?    Seronegative arthropathy of multiple sites (CMS/[facility]) Yes    Chronic gout without tophus, unspecified cause, unspecified site         68-year-old female with Gout and seronegative arthropathy. She does have chronic lower back pain and chronic knee pain. X-ray in the past show severe knee degenerative arthritis worse in the medial area  -Continue with Celebrex 200 mg once daily, use Tylenol as needed  -Continue Plaquenil 200 mg , she is only able to tolerate 1 tab. discussed sulfasalazine if patient has additional joint issues, patient satisfied with current regimen  -Continue with pain management for lower back pain and sciatica  -Continue allopurinol 300 mg daily   -Follow-up in 6 months    "

## 2023-11-08 DIAGNOSIS — M06.09 SERONEGATIVE ARTHROPATHY OF MULTIPLE SITES (MULTI): Primary | ICD-10-CM

## 2023-11-08 RX ORDER — HYDROXYCHLOROQUINE SULFATE 200 MG/1
TABLET, FILM COATED ORAL 2 TIMES DAILY
Qty: 180 TABLET | Refills: 0 | Status: SHIPPED | OUTPATIENT
Start: 2023-11-08 | End: 2024-02-07

## 2023-11-13 ENCOUNTER — ANCILLARY PROCEDURE (OUTPATIENT)
Dept: RADIOLOGY | Facility: CLINIC | Age: 68
End: 2023-11-13
Payer: COMMERCIAL

## 2023-11-13 ENCOUNTER — HOSPITAL ENCOUNTER (OUTPATIENT)
Dept: PAIN MEDICINE | Facility: CLINIC | Age: 68
Discharge: HOME | End: 2023-11-13
Payer: COMMERCIAL

## 2023-11-13 VITALS — RESPIRATION RATE: 18 BRPM | TEMPERATURE: 97.3 F | HEART RATE: 76 BPM | OXYGEN SATURATION: 96 %

## 2023-11-13 DIAGNOSIS — M06.09 SERONEGATIVE ARTHROPATHY OF MULTIPLE SITES (MULTI): ICD-10-CM

## 2023-11-13 DIAGNOSIS — M1A.9XX0 CHRONIC GOUT WITHOUT TOPHUS, UNSPECIFIED CAUSE, UNSPECIFIED SITE: ICD-10-CM

## 2023-11-13 PROCEDURE — 26011 DRAINAGE OF FINGER ABSCESS: CPT | Mod: LT | Performed by: ANESTHESIOLOGY

## 2023-11-13 PROCEDURE — 7100000009 HC PHASE TWO TIME - INITIAL BASE CHARGE: Performed by: ANESTHESIOLOGY

## 2023-11-13 PROCEDURE — 20610 DRAIN/INJ JOINT/BURSA W/O US: CPT

## 2023-11-13 PROCEDURE — 26011 DRAINAGE OF FINGER ABSCESS: CPT | Performed by: ANESTHESIOLOGY

## 2023-11-13 PROCEDURE — 2500000004 HC RX 250 GENERAL PHARMACY W/ HCPCS (ALT 636 FOR OP/ED)

## 2023-11-13 PROCEDURE — 77002 NEEDLE LOCALIZATION BY XRAY: CPT

## 2023-11-13 PROCEDURE — 7100000010 HC PHASE TWO TIME - EACH INCREMENTAL 1 MINUTE: Performed by: ANESTHESIOLOGY

## 2023-11-13 PROCEDURE — 77003 FLUOROGUIDE FOR SPINE INJECT: CPT

## 2023-11-13 PROCEDURE — 2500000005 HC RX 250 GENERAL PHARMACY W/O HCPCS

## 2023-11-13 RX ORDER — LIDOCAINE HYDROCHLORIDE 5 MG/ML
INJECTION, SOLUTION INFILTRATION; INTRAVENOUS
Status: COMPLETED
Start: 2023-11-13 | End: 2023-11-13

## 2023-11-13 RX ORDER — TRIAMCINOLONE ACETONIDE 40 MG/ML
INJECTION, SUSPENSION INTRA-ARTICULAR; INTRAMUSCULAR
Status: COMPLETED
Start: 2023-11-13 | End: 2023-11-13

## 2023-11-13 RX ORDER — BUPIVACAINE HYDROCHLORIDE 7.5 MG/ML
INJECTION, SOLUTION EPIDURAL; RETROBULBAR
Status: COMPLETED
Start: 2023-11-13 | End: 2023-11-13

## 2023-11-13 RX ADMIN — BUPIVACAINE HYDROCHLORIDE 75 MG: 7.5 INJECTION, SOLUTION EPIDURAL; RETROBULBAR at 14:15

## 2023-11-13 RX ADMIN — TRIAMCINOLONE ACETONIDE 40 MG: 40 INJECTION, SUSPENSION INTRA-ARTICULAR; INTRAMUSCULAR at 14:15

## 2023-11-13 RX ADMIN — LIDOCAINE HYDROCHLORIDE 250 MG: 5 INJECTION, SOLUTION INFILTRATION at 14:15

## 2023-11-13 ASSESSMENT — COLUMBIA-SUICIDE SEVERITY RATING SCALE - C-SSRS
2. HAVE YOU ACTUALLY HAD ANY THOUGHTS OF KILLING YOURSELF?: NO
1. IN THE PAST MONTH, HAVE YOU WISHED YOU WERE DEAD OR WISHED YOU COULD GO TO SLEEP AND NOT WAKE UP?: NO
6. HAVE YOU EVER DONE ANYTHING, STARTED TO DO ANYTHING, OR PREPARED TO DO ANYTHING TO END YOUR LIFE?: NO

## 2023-11-13 ASSESSMENT — PAIN - FUNCTIONAL ASSESSMENT: PAIN_FUNCTIONAL_ASSESSMENT: 0-10

## 2023-11-13 ASSESSMENT — PAIN SCALES - GENERAL
PAINLEVEL_OUTOF10: 1
PAINLEVEL_OUTOF10: 9

## 2023-11-13 NOTE — Clinical Note
Prepped with ChloraPrep, a minimum of 3 minute dry time, longer if needed, no pooling noted, patient draped in sterile fashion. Left shoulder

## 2023-11-13 NOTE — OP NOTE
Pre-op diagnosis: Left shoulder arthritis, shoulder pain  Postoperative diagnosis the same    Procedure: Left shoulder subacromial and glenohumeral injection under fluoroscopy guidance    Ready to learn, no apparent learning barriers.  Explained treatment plan. Pt. verbalized understanding. Following review of potential side effects and complications (including but not necessarily limited to infection, allergic reaction, local tissue breakdown, skin blanching, systemic side effects of corticosteroid's, elevation of blood glucose, injury to bodily tissues) they indicated they understood and agreed to proceed.  Because of the patient body habitat with a BMI of 64 x-ray was needed to identify the left shoulder joint.    The patient was brought to the operating room placed in the supine position the left shoulder was prepped and draped in the usual fashion.  Under fluoroscopy guidance the entry site was infiltrated with lidocaine 0.5% using size 25-gauge needle.  Size 22-gauge 5 inch spinal needle was advanced gradually until the joint space was entered.  Omnipaque 300 3 cc were injected showed excellent distribution of the dye in the joint space and around the supraspinatus.  At that time 4 cc of lidocaine 0.75% with Kenalog 40 mg were injected.  The needle was flushed and removed.      Pt. Tolerated the procedure very well and had complete resolution of her symptoms.

## 2023-11-13 NOTE — PROGRESS NOTES
The patient called on 10/30/2023 to have shoulder injection which she is coming for shoulder injection she had bilateral Zilretta injection in the past need to be evaluated for    SUBJECTIVE:  This is 68 y.o.  female with PMH of {kt past medical history:85453},  who is here for follow-up ***      Prior office visit:  7/21/2023: The patient was referred to us by Referring Provider: Dr. Salazar for lumbar radiculopathy, this is 67 year year old female with a history of morbid obesity BMI 61, arthritis worse in the knee, hypertension, seronegative arthropathy with multiple sites started on the methylprednisolone by Dr. Horn and on Plaquenil and Celebrex and get Synvisc injection from orthopedics in her knees presenting with right hip and lower back pain that radiate to her right groin in addition to left knee end-stage arthritis pain and left shoulder that radiated to her left arm. He has no incontinence no saddle paresthesia no paralysis. Her exam today showed possibility of radiculopathy of her cervical spine with radiation at the C5-6 distribution since her isolated left shoulder movements did not cause any significant pain. Her SI joint exam was significantly positive and most likely her pain is related to her SI joint and the right gluteal area that migrated to her hip.  Procedures: Synvisc knee injection by Ortho without benefit   Assessment  The patient exam today is difficult because of her body habitat but it looks to me that she has radiculopathy from her cervical spine causing her left shoulder and arm pain and her right hip pain could be related to SI or right hip arthritis. X-ray of the hip and the cervical spine in addition to the lumbar spine were ordered today. We will plan on right SI joint injection as diagnostic and therapeutic. The patient is candidate for left knee Zilretta injection since she is not a candidate for knee replacement because of her weight.       Procedures:  10/3/2023 bilateral knee  Zilretta injection the patient has had a ***% improvement in pain and function      Portions of record reviewed for pertinent issues: active problem list, medication list, allergies, family history, social history, notes from last encounter, encounters, lab results, imaging and other available records.     I have personally reviewed the OARRS report for this patient. This report is scanned into the electronic medical record. I have considered the risks of abuse, dependence, addiction and diversion. It showed no controlled substance  OPIOID RISK ASSESSMENT SCORE 2/26  Aberrant behavior: None     Pending law suits: Works as   Social History:  lives with her  has 2 adult children and 4 grandchildren finished high school denies smoking drinking or use of illicit drugs                 Diagnostic studies:  5/18/2022 left shoulder x-ray showed mild AC osteoarthritis  2/8/2022 left knee x-ray showed advanced osteoarthritis  6/11/2019 C-spine x-ray showed mild degenerative changes                    I have personally reviewed the OARRS report for this patient. This report is scanned into the electronic medical record. I have considered the risks of abuse, dependence, addiction and diversion. It showed: ***  OPIOID RISK ASSESSMENT SCORE ***/26  Opioid agreement: ***  Activities of daily living: ***  Adverse effects: ***  Analgesia: W/O: ***/10, W ***/10    Toxicology screen: ***  Aberrant behavior: ***        Review of Systems   Physical Exam                 Plan  At least 50% of the visit was involved in the discussion of the options for treatment. We discussed exercises, medication, interventional therapies and surgery. Healthy life style is essential with patient hard work to achieve the wellness. In addition; discussion with the patient and/or family about any of the diagnostic results, impressions and/or recommended diagnostic studies, prognosis, risks and benefits of treatment options,  instructions for treatment and/or follow-up, importance of compliance with chosen treatment options, risk-factor reduction, and patient/family education.         Pool therapy, walking in the pool, at least 3x per week for 30 minutes  Continue self-directed physical therapy  *** Smoking cessation  Healthy lifestyle and anti-inflammatory diet in addition to weight control discussed with the patient  Alternative chronic pain therapies was discussed, encouraged and information was handed  Return to Clinic ***     *Please note this report has been produced using speech recognition software and may contain errors related to that system including grammar, punctuation and spelling as well as words and phrases that may be inappropriate. If there are questions or concerns, please feel free to contact me to clarify.    hSannan Brown MD       Procedures:  *** the patient has had a ***% improvement in pain and function      Portions of record reviewed for pertinent issues: active problem list, medication list, allergies, family history, social history, notes from last encounter, encounters, lab results, imaging and other available records.

## 2023-11-14 ENCOUNTER — APPOINTMENT (OUTPATIENT)
Dept: PAIN MEDICINE | Facility: CLINIC | Age: 68
End: 2023-11-14
Payer: COMMERCIAL

## 2023-12-02 DIAGNOSIS — M06.09 SERONEGATIVE ARTHROPATHY OF MULTIPLE SITES (MULTI): ICD-10-CM

## 2023-12-04 RX ORDER — CELECOXIB 200 MG/1
200 CAPSULE ORAL DAILY
Qty: 90 CAPSULE | Refills: 0 | Status: SHIPPED | OUTPATIENT
Start: 2023-12-04

## 2024-01-15 NOTE — H&P (VIEW-ONLY)
SUBJECTIVE:  This is 68 y.o.  female with PMH of history of morbid obesity BMI 61, arthritis worse in the knee, hypertension, seronegative arthropathy with multiple sites started on the methylprednisolone by Dr. Horn and on Plaquenil and Celebrex and get Synvisc injection from orthopedics in her knees presenting with right hip and lower back pain that radiate to her right groin in addition to left knee end-stage arthritis pain and left shoulder that radiated to her left arm in addition to right hip arthritis and right SI joint pain.  The patient received left shoulder intra-articular injection under fluoroscopy in addition to left knee Zilretta injection and right SI joint injection who is here for follow-up stating the Zilretta injection did not help with her knee pain the same thing with her shoulder injection but she had a great response to her right SI joint injection.  The patient stated that she used to get gel injection from orthopedics and she is asking if we can do that.  I plan for left knee Synvisc one injection under fluoroscopy guidance.      Prior office visit:  7/21/2023: The patient was referred to us by Referring Provider: Dr. Salazar for lumbar radiculopathy, this is 67 year year old female with a history of morbid obesity BMI 61, arthritis worse in the knee, hypertension, seronegative arthropathy with multiple sites started on the methylprednisolone by Dr. Horn and on Plaquenil and Celebrex and get Synvisc injection from orthopedics in her knees presenting with right hip and lower back pain that radiate to her right groin in addition to left knee end-stage arthritis pain and left shoulder that radiated to her left arm. He has no incontinence no saddle paresthesia no paralysis. Her exam today showed possibility of radiculopathy of her cervical spine with radiation at the C5-6 distribution since her isolated left shoulder movements did not cause any significant pain. Her SI joint exam was  significantly positive and most likely her pain is related to her SI joint and the right gluteal area that migrated to her hip.       Assessment  The patient exam today is difficult because of her body habitat but it looks to me that she has radiculopathy from her cervical spine causing her left shoulder and arm pain and her right hip pain could be related to SI or right hip arthritis. X-ray of the hip and the cervical spine in addition to the lumbar spine were ordered today. We will plan on right SI joint injection as diagnostic and therapeutic. The patient is candidate for left knee Zilretta injection since she is not a candidate for knee replacement because of her weight.           Procedures:  11/13/2023 left shoulder intra-articular injection under fluoroscopy the patient has had a 0% improvement in pain and function  9/3/2023 left knee Zilretta injection patient has had 0% improvement in pain and function  8/7/2023 right SI injection patient has had 100% improvement in pain and function      Portions of record reviewed for pertinent issues: active problem list, medication list, allergies, family history, social history, notes from last encounter, encounters, lab results, imaging and other available records.  I have personally reviewed the OARRS report for this patient. This report is scanned into the electronic medical record. I have considered the risks of abuse, dependence, addiction and diversion. It showed no controlled substance  OPIOID RISK ASSESSMENT SCORE 2/26  Aberrant behavior: None     Pending law suits: Works as   Social History:  lives with her  has 2 adult children and 4 grandchildren finished high school denies smoking drinking or use of illicit drugs                 Diagnostic studies:  5/18/2022 left shoulder x-ray showed mild AC osteoarthritis  2/8/2022 left knee x-ray showed advanced osteoarthritis  6/11/2019 C-spine x-ray showed mild degenerative changes        Review of Systems   HENT: Negative.     Eyes: Negative.    Respiratory: Negative.     Cardiovascular: Negative.    Gastrointestinal: Negative.    Endocrine: Negative.    Genitourinary: Negative.    Musculoskeletal:  Positive for arthralgias and back pain.   Skin: Negative.    Neurological: Negative.    Hematological: Negative.    Psychiatric/Behavioral: Negative.          Physical Exam  Musculoskeletal:         General: Tenderness present.   Neurological:      Cranial Nerves: Cranial nerve deficit present.      Sensory: Sensory deficit present.                      Plan  At least 50% of the visit was involved in the discussion of the options for treatment. We discussed exercises, medication, interventional therapies and surgery. Healthy life style is essential with patient hard work to achieve the wellness. In addition; discussion with the patient and/or family about any of the diagnostic results, impressions and/or recommended diagnostic studies, prognosis, risks and benefits of treatment options, instructions for treatment and/or follow-up, importance of compliance with chosen treatment options, risk-factor reduction, and patient/family education.         Pool therapy, walking in the pool, at least 3x per week for 30 minutes  Continue self-directed physical therapy  Left knee Synvisc injection under fluoroscopy guidance  May repeat SI joint injection when pain comes back  Healthy lifestyle and anti-inflammatory diet in addition to weight control discussed with the patient  Alternative chronic pain therapies was discussed, encouraged and information was handed  Return to Clinic after the injection     *Please note this report has been produced using speech recognition software and may contain errors related to that system including grammar, punctuation and spelling as well as words and phrases that may be inappropriate. If there are questions or concerns, please feel free to contact me to clarify.    Shannan  MD Kevin

## 2024-01-18 ENCOUNTER — OFFICE VISIT (OUTPATIENT)
Dept: PAIN MEDICINE | Facility: CLINIC | Age: 69
End: 2024-01-18
Payer: COMMERCIAL

## 2024-01-18 VITALS
BODY MASS INDEX: 55.32 KG/M2 | HEIGHT: 61 IN | HEART RATE: 76 BPM | WEIGHT: 293 LBS | TEMPERATURE: 97.3 F | SYSTOLIC BLOOD PRESSURE: 173 MMHG | DIASTOLIC BLOOD PRESSURE: 80 MMHG | RESPIRATION RATE: 16 BRPM

## 2024-01-18 DIAGNOSIS — M17.10 ARTHRITIS OF KNEE: Primary | ICD-10-CM

## 2024-01-18 PROCEDURE — 1125F AMNT PAIN NOTED PAIN PRSNT: CPT | Performed by: ANESTHESIOLOGY

## 2024-01-18 PROCEDURE — 1036F TOBACCO NON-USER: CPT | Performed by: ANESTHESIOLOGY

## 2024-01-18 PROCEDURE — 99214 OFFICE O/P EST MOD 30 MIN: CPT | Performed by: ANESTHESIOLOGY

## 2024-01-18 PROCEDURE — 3077F SYST BP >= 140 MM HG: CPT | Performed by: ANESTHESIOLOGY

## 2024-01-18 PROCEDURE — 1159F MED LIST DOCD IN RCRD: CPT | Performed by: ANESTHESIOLOGY

## 2024-01-18 PROCEDURE — 3079F DIAST BP 80-89 MM HG: CPT | Performed by: ANESTHESIOLOGY

## 2024-01-18 PROCEDURE — 3008F BODY MASS INDEX DOCD: CPT | Performed by: ANESTHESIOLOGY

## 2024-01-18 PROCEDURE — 1160F RVW MEDS BY RX/DR IN RCRD: CPT | Performed by: ANESTHESIOLOGY

## 2024-01-18 ASSESSMENT — PAIN DESCRIPTION - DESCRIPTORS: DESCRIPTORS: SHARP

## 2024-01-18 ASSESSMENT — ENCOUNTER SYMPTOMS
OCCASIONAL FEELINGS OF UNSTEADINESS: 0
NEUROLOGICAL NEGATIVE: 1
ARTHRALGIAS: 1
CARDIOVASCULAR NEGATIVE: 1
BACK PAIN: 1
LOSS OF SENSATION IN FEET: 0
PSYCHIATRIC NEGATIVE: 1
ENDOCRINE NEGATIVE: 1
HEMATOLOGIC/LYMPHATIC NEGATIVE: 1
DEPRESSION: 0
RESPIRATORY NEGATIVE: 1
GASTROINTESTINAL NEGATIVE: 1
EYES NEGATIVE: 1

## 2024-01-18 ASSESSMENT — LIFESTYLE VARIABLES: TOTAL SCORE: 1

## 2024-01-18 ASSESSMENT — COLUMBIA-SUICIDE SEVERITY RATING SCALE - C-SSRS
2. HAVE YOU ACTUALLY HAD ANY THOUGHTS OF KILLING YOURSELF?: NO
6. HAVE YOU EVER DONE ANYTHING, STARTED TO DO ANYTHING, OR PREPARED TO DO ANYTHING TO END YOUR LIFE?: NO
1. IN THE PAST MONTH, HAVE YOU WISHED YOU WERE DEAD OR WISHED YOU COULD GO TO SLEEP AND NOT WAKE UP?: NO

## 2024-01-18 ASSESSMENT — PAIN SCALES - GENERAL
PAINLEVEL_OUTOF10: 3
PAINLEVEL: 3

## 2024-01-18 ASSESSMENT — PATIENT HEALTH QUESTIONNAIRE - PHQ9
1. LITTLE INTEREST OR PLEASURE IN DOING THINGS: NOT AT ALL
SUM OF ALL RESPONSES TO PHQ9 QUESTIONS 1 AND 2: 0
2. FEELING DOWN, DEPRESSED OR HOPELESS: NOT AT ALL

## 2024-01-18 ASSESSMENT — PAIN - FUNCTIONAL ASSESSMENT: PAIN_FUNCTIONAL_ASSESSMENT: 0-10

## 2024-01-18 NOTE — PROGRESS NOTES
This is 68 y.o.  female with who has been treated for  therapy and shoulder . Pain is unchanged, The pain is described as sharp and  feels rubbing   and is relieved by holding her arm across her chest Here for follow-up .  Continues to have left knee pain and swelling.  Chief Complaint   Patient presents with    Follow-up     11/13/2023 left shoulder intra-articular injection       Pain Therapies: injections    Opioid Risk Assessment Score 1/26

## 2024-01-22 ENCOUNTER — OFFICE VISIT (OUTPATIENT)
Dept: PRIMARY CARE | Facility: CLINIC | Age: 69
End: 2024-01-22
Payer: COMMERCIAL

## 2024-01-22 ENCOUNTER — LAB (OUTPATIENT)
Dept: LAB | Facility: LAB | Age: 69
End: 2024-01-22
Payer: COMMERCIAL

## 2024-01-22 ENCOUNTER — APPOINTMENT (OUTPATIENT)
Dept: PAIN MEDICINE | Facility: CLINIC | Age: 69
End: 2024-01-22
Payer: COMMERCIAL

## 2024-01-22 ENCOUNTER — APPOINTMENT (OUTPATIENT)
Dept: RADIOLOGY | Facility: CLINIC | Age: 69
End: 2024-01-22
Payer: COMMERCIAL

## 2024-01-22 VITALS — DIASTOLIC BLOOD PRESSURE: 82 MMHG | SYSTOLIC BLOOD PRESSURE: 137 MMHG

## 2024-01-22 DIAGNOSIS — Z00.00 HEALTH CARE MAINTENANCE: ICD-10-CM

## 2024-01-22 DIAGNOSIS — Z00.00 HEALTHCARE MAINTENANCE: ICD-10-CM

## 2024-01-22 DIAGNOSIS — B35.1 ONYCHOMYCOSIS: Primary | ICD-10-CM

## 2024-01-22 DIAGNOSIS — E78.1 ENDOGENOUS HYPERLIPIDEMIA: ICD-10-CM

## 2024-01-22 DIAGNOSIS — R53.83 FATIGUE, UNSPECIFIED TYPE: ICD-10-CM

## 2024-01-22 DIAGNOSIS — I10 HYPERTENSION, BENIGN: ICD-10-CM

## 2024-01-22 DIAGNOSIS — M17.10 PRIMARY OSTEOARTHRITIS OF KNEE, UNSPECIFIED LATERALITY: Primary | ICD-10-CM

## 2024-01-22 DIAGNOSIS — E83.52 SERUM CALCIUM ELEVATED: ICD-10-CM

## 2024-01-22 LAB
ALBUMIN SERPL BCP-MCNC: 4.7 G/DL (ref 3.4–5)
ALP SERPL-CCNC: 115 U/L (ref 33–136)
ALT SERPL W P-5'-P-CCNC: 19 U/L (ref 7–45)
AST SERPL W P-5'-P-CCNC: 15 U/L (ref 9–39)
BILIRUB DIRECT SERPL-MCNC: 0.2 MG/DL (ref 0–0.3)
BILIRUB SERPL-MCNC: 0.9 MG/DL (ref 0–1.2)
CHOLEST SERPL-MCNC: 200 MG/DL (ref 0–199)
CHOLESTEROL/HDL RATIO: 3.2
HDLC SERPL-MCNC: 62.5 MG/DL
LDLC SERPL CALC-MCNC: 96 MG/DL
NON HDL CHOLESTEROL: 138 MG/DL (ref 0–149)
PROT SERPL-MCNC: 7.4 G/DL (ref 6.4–8.2)
TRIGL SERPL-MCNC: 206 MG/DL (ref 0–149)
VLDL: 41 MG/DL (ref 0–40)

## 2024-01-22 PROCEDURE — 82607 VITAMIN B-12: CPT

## 2024-01-22 PROCEDURE — 84443 ASSAY THYROID STIM HORMONE: CPT

## 2024-01-22 PROCEDURE — 1036F TOBACCO NON-USER: CPT | Performed by: STUDENT IN AN ORGANIZED HEALTH CARE EDUCATION/TRAINING PROGRAM

## 2024-01-22 PROCEDURE — 3008F BODY MASS INDEX DOCD: CPT | Performed by: STUDENT IN AN ORGANIZED HEALTH CARE EDUCATION/TRAINING PROGRAM

## 2024-01-22 PROCEDURE — 80061 LIPID PANEL: CPT

## 2024-01-22 PROCEDURE — 36415 COLL VENOUS BLD VENIPUNCTURE: CPT

## 2024-01-22 PROCEDURE — 1160F RVW MEDS BY RX/DR IN RCRD: CPT | Performed by: STUDENT IN AN ORGANIZED HEALTH CARE EDUCATION/TRAINING PROGRAM

## 2024-01-22 PROCEDURE — 1125F AMNT PAIN NOTED PAIN PRSNT: CPT | Performed by: STUDENT IN AN ORGANIZED HEALTH CARE EDUCATION/TRAINING PROGRAM

## 2024-01-22 PROCEDURE — 3075F SYST BP GE 130 - 139MM HG: CPT | Performed by: STUDENT IN AN ORGANIZED HEALTH CARE EDUCATION/TRAINING PROGRAM

## 2024-01-22 PROCEDURE — 80076 HEPATIC FUNCTION PANEL: CPT

## 2024-01-22 PROCEDURE — 3079F DIAST BP 80-89 MM HG: CPT | Performed by: STUDENT IN AN ORGANIZED HEALTH CARE EDUCATION/TRAINING PROGRAM

## 2024-01-22 PROCEDURE — 99214 OFFICE O/P EST MOD 30 MIN: CPT | Performed by: STUDENT IN AN ORGANIZED HEALTH CARE EDUCATION/TRAINING PROGRAM

## 2024-01-22 RX ORDER — TELMISARTAN AND HYDROCHLORTHIAZIDE 80; 12.5 MG/1; MG/1
1 TABLET ORAL DAILY
Qty: 90 TABLET | Refills: 1 | Status: SHIPPED | OUTPATIENT
Start: 2024-01-22 | End: 2024-02-02 | Stop reason: SDUPTHER

## 2024-01-22 RX ORDER — AMLODIPINE BESYLATE 5 MG/1
5 TABLET ORAL DAILY
Qty: 90 TABLET | Refills: 1 | Status: SHIPPED | OUTPATIENT
Start: 2024-01-22 | End: 2024-02-15

## 2024-01-22 NOTE — PROGRESS NOTES
Subjective   Patient ID: Marcella Muñoz is a 68 y.o. female who presents for Follow-up (Med refill).    HPI   Routine fu.  Med refill.  She is seeing multiple specialists.  She has chronic toenail fungus, interested in seeing podiatry.  She stopped taking cholestyramine, loose stools are manageable.   Review of Systems  12-point ROS reviewed and was negative unless otherwise noted in HPI.    Objective   There were no vitals taken for this visit.    Physical Exam  GEN: conversant, NAD  HEENT: PERRL, EOMI, MMM  NECK: supple, full  CV: S1, S2, RRR  PULM: CTAB  ABD: soft, NT, obese  NEURO: no new gross focal deficits  EXT: no sig LE edema  PSYCH: appropriate affect    Assessment/Plan     #Chronic LBP/sciatica: She is seeing pain management.      #Elevated serum Calcium: resolved on recent labs.     #Seronegative arthritis  #Hyperuricemia without dx of gout  -Continue allopurinol  -Continue Celebrex per rheumatology      #Hypertension  -stable on home reads, continue current meds, refilled     #Severe obesity  #OA  -Patient has met with nutritionist in the past, she states that she lost 90 pounds on the keto diet, is now back on this diet  -Encourage lifestyle modifications, previously offered referral to aquatic therapy/physical therapy and nutritionist, but she declined   -may benefit from bariatric surgery for weight and to help with OA      #Obstructive sleep apnea, suspected  -Patient has multiple risk factors with regards to sleep apnea she also endorses that she snores at night  -She was offered sleep referral previously, but she declined      #ROD  -denied any complications, has never seen GI      #Health maintenance  -She follows with OB/GYN up-to-date on mammograms  -DEXA screening ordered  -follows with eye doctor annually   -Colonoscopy done in 2023, follows with Dr. Alonso  -flu shot UTD 2023  -Advised RSV vaccine  -up-to-date on shingles vaccine  -PPSV23 given 2022     RTC 6 months

## 2024-01-23 ENCOUNTER — HOSPITAL ENCOUNTER (OUTPATIENT)
Dept: RADIOLOGY | Facility: CLINIC | Age: 69
Discharge: HOME | End: 2024-01-23
Payer: COMMERCIAL

## 2024-01-23 ENCOUNTER — HOSPITAL ENCOUNTER (OUTPATIENT)
Dept: PAIN MEDICINE | Facility: CLINIC | Age: 69
Discharge: HOME | End: 2024-01-23
Payer: COMMERCIAL

## 2024-01-23 VITALS
OXYGEN SATURATION: 97 % | TEMPERATURE: 38.5 F | RESPIRATION RATE: 16 BRPM | DIASTOLIC BLOOD PRESSURE: 81 MMHG | HEART RATE: 69 BPM | SYSTOLIC BLOOD PRESSURE: 181 MMHG

## 2024-01-23 DIAGNOSIS — M25.562 CHRONIC PAIN OF BOTH KNEES: ICD-10-CM

## 2024-01-23 DIAGNOSIS — M17.10 ARTHRITIS OF KNEE: ICD-10-CM

## 2024-01-23 DIAGNOSIS — G89.29 CHRONIC PAIN OF BOTH KNEES: ICD-10-CM

## 2024-01-23 DIAGNOSIS — M06.09 SERONEGATIVE ARTHROPATHY OF MULTIPLE SITES (MULTI): Primary | ICD-10-CM

## 2024-01-23 DIAGNOSIS — M06.4 POLYARTHRITIS, INFLAMMATORY (MULTI): ICD-10-CM

## 2024-01-23 DIAGNOSIS — M25.561 CHRONIC PAIN OF BOTH KNEES: ICD-10-CM

## 2024-01-23 LAB
TSH SERPL-ACNC: 1.82 MIU/L (ref 0.44–3.98)
VIT B12 SERPL-MCNC: 373 PG/ML (ref 211–911)

## 2024-01-23 PROCEDURE — 2500000005 HC RX 250 GENERAL PHARMACY W/O HCPCS

## 2024-01-23 PROCEDURE — 2500000004 HC RX 250 GENERAL PHARMACY W/ HCPCS (ALT 636 FOR OP/ED): Mod: JZ | Performed by: ANESTHESIOLOGY

## 2024-01-23 PROCEDURE — 77003 FLUOROGUIDE FOR SPINE INJECT: CPT

## 2024-01-23 PROCEDURE — 20611 DRAIN/INJ JOINT/BURSA W/US: CPT | Performed by: ANESTHESIOLOGY

## 2024-01-23 RX ORDER — LIDOCAINE HYDROCHLORIDE 5 MG/ML
INJECTION, SOLUTION INFILTRATION; INTRAVENOUS
Status: COMPLETED
Start: 2024-01-23 | End: 2024-01-23

## 2024-01-23 RX ORDER — CELECOXIB 200 MG/1
CAPSULE ORAL
Qty: 180 CAPSULE | Refills: 4 | Status: SHIPPED | OUTPATIENT
Start: 2024-01-23

## 2024-01-23 RX ORDER — BUPIVACAINE HYDROCHLORIDE 7.5 MG/ML
INJECTION, SOLUTION EPIDURAL; RETROBULBAR
Status: COMPLETED
Start: 2024-01-23 | End: 2024-01-23

## 2024-01-23 RX ADMIN — BUPIVACAINE HYDROCHLORIDE 75 MG: 7.5 INJECTION, SOLUTION EPIDURAL; RETROBULBAR at 14:55

## 2024-01-23 RX ADMIN — LIDOCAINE HYDROCHLORIDE 250 MG: 5 INJECTION, SOLUTION INFILTRATION at 14:55

## 2024-01-23 RX ADMIN — Medication 48 MG: at 14:56

## 2024-01-23 ASSESSMENT — PAIN - FUNCTIONAL ASSESSMENT: PAIN_FUNCTIONAL_ASSESSMENT: 0-10

## 2024-01-23 ASSESSMENT — COLUMBIA-SUICIDE SEVERITY RATING SCALE - C-SSRS
1. IN THE PAST MONTH, HAVE YOU WISHED YOU WERE DEAD OR WISHED YOU COULD GO TO SLEEP AND NOT WAKE UP?: NO
2. HAVE YOU ACTUALLY HAD ANY THOUGHTS OF KILLING YOURSELF?: NO
6. HAVE YOU EVER DONE ANYTHING, STARTED TO DO ANYTHING, OR PREPARED TO DO ANYTHING TO END YOUR LIFE?: NO

## 2024-01-23 ASSESSMENT — ENCOUNTER SYMPTOMS
OCCASIONAL FEELINGS OF UNSTEADINESS: 0
LOSS OF SENSATION IN FEET: 0
DEPRESSION: 0

## 2024-01-23 ASSESSMENT — PAIN SCALES - GENERAL: PAINLEVEL_OUTOF10: 7

## 2024-01-23 NOTE — OP NOTE
Pre-op diagnosis: Knee arthritis  Postoperative diagnosis: The same    Procedure: left knee Synvisc one 48 mg injection under fluoroscopy guidance.  Blood loss: 0 mL  Complications: None    Ready to learn, no apparent learning barriers.  Explained treatment plan. Pt. verbalized understanding. Following review of potential side effects and complications (including but not necessarily limited to infection, allergic reaction, local tissue breakdown, injury to bodily tissues. The patient expressed understanding of this risk and wishes to proceed with the elective procedure.    The patient was brought to the operating room and placed in the supine position with pillows underneath the left knees..The procedure was carried out under sterile prep with Chlora-prep.  Under fluoroscopy guidance the skin was infiltrated with lidocaine 0.5% using 25 ga 1.5 inche  needle and size 18-gauge 1.5 inch needle was introduced and advanced into the lateral/caudal area of the patella into the ligament then the knee joint and after negative aspiration 0.5 cc of Omnipaque 300 was injected and showed excellent distribution of the dye into the joint cavity.  Next Synvisc one 48 mg + 2 cc of 0.75 bupivacaine were injected into the joint.  The needle was flushed and removed.         Patient tolerated the procedure very well and discharged to the recovery room in stable condition.

## 2024-01-23 NOTE — OP NOTE
There are no changes in health medical management or allergies since last visit on 1/19/2024  Pre-op diagnosis: Hip arthritis  Postoperative diagnosis: the same    Procedure: right Hip injection under fluoroscopy guidance.  Blood loss: 0  Complications: None    Ready to learn, no apparent learning barriers.  Explained treatment plan. Pt. verbalized understanding. Following review of potential side effects and complications (including but not necessarily limited to infection, allergic reaction, local tissue breakdown, skin blanching, systemic side effects of corticosteroid's, elevation of blood glucose, injury to bodily tissues) they indicated they understood and agreed to proceed.    The procedure was carried out under sterile prep with Chlora-prep.  Under fluoroscopy guidance the skin was infiltrated with lidocaine 1% using size 25-gauge 1.5 inch needle.  Then Spinal needle 22-gauge, 3.5 inch was introduced through the capsule into the joint.  Omnipaque 300 was injected showed excellent distribution of the dye in the joint space.  4 cc of bupivacaine 0.075% plus Kenalog 40 mg were injected.  The needle was flushed and removed.    Pt. Tolerated the procedure very well and had complete resolution of her symptoms.

## 2024-01-23 NOTE — Clinical Note
Prepped with ChloraPrep, a minimum of 3 minute dry time, longer if needed, no pooling noted, patient draped in sterile fashion. Left knee

## 2024-02-02 DIAGNOSIS — I10 HYPERTENSION, BENIGN: ICD-10-CM

## 2024-02-02 RX ORDER — TELMISARTAN AND HYDROCHLORTHIAZIDE 80; 12.5 MG/1; MG/1
1 TABLET ORAL DAILY
Qty: 90 TABLET | Refills: 1 | Status: SHIPPED | OUTPATIENT
Start: 2024-02-02

## 2024-02-06 DIAGNOSIS — M06.09 SERONEGATIVE ARTHROPATHY OF MULTIPLE SITES (MULTI): ICD-10-CM

## 2024-02-07 RX ORDER — HYDROXYCHLOROQUINE SULFATE 200 MG/1
TABLET, FILM COATED ORAL 2 TIMES DAILY
Qty: 180 TABLET | Refills: 0 | Status: SHIPPED | OUTPATIENT
Start: 2024-02-07

## 2024-02-12 ENCOUNTER — HOSPITAL ENCOUNTER (OUTPATIENT)
Dept: RADIOLOGY | Facility: CLINIC | Age: 69
Discharge: HOME | End: 2024-02-12
Payer: COMMERCIAL

## 2024-02-12 ENCOUNTER — HOSPITAL ENCOUNTER (OUTPATIENT)
Dept: PAIN MEDICINE | Facility: CLINIC | Age: 69
Discharge: HOME | End: 2024-02-12
Payer: COMMERCIAL

## 2024-02-12 VITALS
SYSTOLIC BLOOD PRESSURE: 178 MMHG | OXYGEN SATURATION: 94 % | RESPIRATION RATE: 16 BRPM | HEART RATE: 76 BPM | TEMPERATURE: 98.4 F | DIASTOLIC BLOOD PRESSURE: 91 MMHG

## 2024-02-12 DIAGNOSIS — M17.10 ARTHRITIS OF KNEE: ICD-10-CM

## 2024-02-12 PROCEDURE — 77003 FLUOROGUIDE FOR SPINE INJECT: CPT | Mod: 59

## 2024-02-12 PROCEDURE — 27096 INJECT SACROILIAC JOINT: CPT | Performed by: ANESTHESIOLOGY

## 2024-02-12 PROCEDURE — 2500000004 HC RX 250 GENERAL PHARMACY W/ HCPCS (ALT 636 FOR OP/ED)

## 2024-02-12 PROCEDURE — 2500000005 HC RX 250 GENERAL PHARMACY W/O HCPCS

## 2024-02-12 RX ORDER — TRIAMCINOLONE ACETONIDE 40 MG/ML
INJECTION, SUSPENSION INTRA-ARTICULAR; INTRAMUSCULAR
Status: COMPLETED
Start: 2024-02-12 | End: 2024-02-12

## 2024-02-12 RX ORDER — BUPIVACAINE HYDROCHLORIDE 7.5 MG/ML
INJECTION, SOLUTION EPIDURAL; RETROBULBAR
Status: COMPLETED
Start: 2024-02-12 | End: 2024-02-12

## 2024-02-12 RX ORDER — LIDOCAINE HYDROCHLORIDE 5 MG/ML
INJECTION, SOLUTION INFILTRATION; INTRAVENOUS
Status: COMPLETED
Start: 2024-02-12 | End: 2024-02-12

## 2024-02-12 RX ADMIN — BUPIVACAINE HYDROCHLORIDE 75 MG: 7.5 INJECTION, SOLUTION EPIDURAL; RETROBULBAR at 13:13

## 2024-02-12 RX ADMIN — LIDOCAINE HYDROCHLORIDE 500 MG: 5 INJECTION, SOLUTION INFILTRATION at 13:12

## 2024-02-12 RX ADMIN — TRIAMCINOLONE ACETONIDE 40 MG: 40 INJECTION, SUSPENSION INTRA-ARTICULAR; INTRAMUSCULAR at 13:13

## 2024-02-12 ASSESSMENT — PAIN SCALES - GENERAL
PAINLEVEL_OUTOF10: 7
PAINLEVEL_OUTOF10: 3

## 2024-02-12 ASSESSMENT — PAIN - FUNCTIONAL ASSESSMENT: PAIN_FUNCTIONAL_ASSESSMENT: 0-10

## 2024-02-12 ASSESSMENT — ENCOUNTER SYMPTOMS
LOSS OF SENSATION IN FEET: 0
OCCASIONAL FEELINGS OF UNSTEADINESS: 0

## 2024-02-12 ASSESSMENT — COLUMBIA-SUICIDE SEVERITY RATING SCALE - C-SSRS
1. IN THE PAST MONTH, HAVE YOU WISHED YOU WERE DEAD OR WISHED YOU COULD GO TO SLEEP AND NOT WAKE UP?: NO
2. HAVE YOU ACTUALLY HAD ANY THOUGHTS OF KILLING YOURSELF?: NO

## 2024-02-12 NOTE — OP NOTE
Patient has no changes in health medical management or allergies since last visit on 1/18/2024    Pre-op diagnosis: Sacroiliitis  Postop diagnosis: The same    PROCEDURE: right sacroiliac joint Injection     Estimated blood loss: None  Complications: None        INDICATIONS: 68 y.o. female is a pleasant person with a significant history of lower back pain with radiation to lower to gluteal area.  Physical exam and radiological findings correlate with the pre-operative diagnoses.  Because of these findings, we have elected to proceed with the above injection.    PROCEDURE: After sufficient consent was signed, the patient was brought to the Operating Room and was placed in the prone position with a pillow underneath the hips. The back was prepped and draped in the usual sterile fashion.     Under fluoroscopic guidance the right SI joint was identified. The skin at the entry site was infiltrated with 0.5% Lidocaine  using a size #25-gauge needle. A size Spinal needle 22-gauge, 5 inch was introduced gradually until the joint was encountered. The needle was advanced into the SI joint. Next, 1 cc of Omnipaque 300 was injected and showed excellent distribution of the dye into the joint.  At that time, Kenalog 40 mg plus 3cc of  0.75% Bupivacaine were injected. The needle was flushed and removed.        The patient tolerated the procedure very well and was transferred to the Recovery Room in stable condition.  She had stable resolution of symptoms.  She is to follow-up in the [facility] as scheduled.

## 2024-02-15 DIAGNOSIS — I10 HYPERTENSION, BENIGN: ICD-10-CM

## 2024-02-15 RX ORDER — AMLODIPINE BESYLATE 5 MG/1
5 TABLET ORAL DAILY
Qty: 90 TABLET | Refills: 1 | Status: SHIPPED | OUTPATIENT
Start: 2024-02-15

## 2024-03-01 ENCOUNTER — APPOINTMENT (OUTPATIENT)
Dept: PAIN MEDICINE | Facility: CLINIC | Age: 69
End: 2024-03-01
Payer: COMMERCIAL

## 2024-05-13 ENCOUNTER — OFFICE VISIT (OUTPATIENT)
Dept: RHEUMATOLOGY | Facility: CLINIC | Age: 69
End: 2024-05-13
Payer: COMMERCIAL

## 2024-05-13 ENCOUNTER — LAB (OUTPATIENT)
Dept: LAB | Facility: LAB | Age: 69
End: 2024-05-13
Payer: COMMERCIAL

## 2024-05-13 VITALS
DIASTOLIC BLOOD PRESSURE: 92 MMHG | HEIGHT: 61 IN | WEIGHT: 293 LBS | BODY MASS INDEX: 55.32 KG/M2 | SYSTOLIC BLOOD PRESSURE: 174 MMHG | HEART RATE: 85 BPM

## 2024-05-13 DIAGNOSIS — M1A.9XX0 CHRONIC GOUT WITHOUT TOPHUS, UNSPECIFIED CAUSE, UNSPECIFIED SITE: Primary | ICD-10-CM

## 2024-05-13 DIAGNOSIS — M1A.9XX0 CHRONIC GOUT WITHOUT TOPHUS, UNSPECIFIED CAUSE, UNSPECIFIED SITE: ICD-10-CM

## 2024-05-13 DIAGNOSIS — M06.09 SERONEGATIVE ARTHROPATHY OF MULTIPLE SITES (MULTI): ICD-10-CM

## 2024-05-13 LAB
CRP SERPL-MCNC: 0.56 MG/DL
ERYTHROCYTE [SEDIMENTATION RATE] IN BLOOD BY WESTERGREN METHOD: 21 MM/H (ref 0–30)
URATE SERPL-MCNC: 6.5 MG/DL (ref 2.3–6.7)

## 2024-05-13 PROCEDURE — 1159F MED LIST DOCD IN RCRD: CPT | Performed by: INTERNAL MEDICINE

## 2024-05-13 PROCEDURE — 84550 ASSAY OF BLOOD/URIC ACID: CPT

## 2024-05-13 PROCEDURE — 3080F DIAST BP >= 90 MM HG: CPT | Performed by: INTERNAL MEDICINE

## 2024-05-13 PROCEDURE — 1036F TOBACCO NON-USER: CPT | Performed by: INTERNAL MEDICINE

## 2024-05-13 PROCEDURE — 99214 OFFICE O/P EST MOD 30 MIN: CPT | Performed by: INTERNAL MEDICINE

## 2024-05-13 PROCEDURE — 3008F BODY MASS INDEX DOCD: CPT | Performed by: INTERNAL MEDICINE

## 2024-05-13 PROCEDURE — 86140 C-REACTIVE PROTEIN: CPT

## 2024-05-13 PROCEDURE — 3077F SYST BP >= 140 MM HG: CPT | Performed by: INTERNAL MEDICINE

## 2024-05-13 PROCEDURE — 85652 RBC SED RATE AUTOMATED: CPT

## 2024-05-13 PROCEDURE — 36415 COLL VENOUS BLD VENIPUNCTURE: CPT

## 2024-05-13 PROCEDURE — 1160F RVW MEDS BY RX/DR IN RCRD: CPT | Performed by: INTERNAL MEDICINE

## 2024-05-13 NOTE — PROGRESS NOTES
Follow-up Rheumatology Patient Visit    Chief Complaint:  Marcella Muñoz is a 68 y.o. female presenting today for Follow-up.    History of Presenting Problem:   69 y/o female with Gout and seronegative Arthropathy present for f/u.  She is currently on Celebrex 200 mg and allopurinol 300 mg daily.  However patient decided allopurinol was causing hair loss over the years and stopped this recently she has been off of it for about a month.   She continues to deal with chronic knee pain and sciatica.  She takes Celebrex 1 pill daily but occasionally she will have to take 2 because pain management said it was okay to do this  Problem List:   Patient Active Problem List   Diagnosis   • Alkaline phosphatase elevation   • Arthropathy   • Carpal tunnel syndrome of left wrist   • Carpal tunnel syndrome of right wrist   • Chronic pain of both knees   • Decreased hearing of both ears   • Diverticulosis of colon   • DJD (degenerative joint disease) of knee   • Endogenous hyperlipidemia   • Fall   • Gout   • Hyperuricemia   • Hypertension, benign   • Knee pain   • Left rotator cuff tear arthropathy   • Left shoulder pain   • Low back pain   • Low serum vitamin D   • NAFLD (nonalcoholic fatty liver disease)   • Pain in both upper extremities   • Polyarthritis, inflammatory (Multi)   • Seronegative arthropathy of multiple sites (Multi)   • Suspected sleep apnea   • Vitamin D deficiency   • Class 3 severe obesity due to excess calories with serious comorbidity and body mass index (BMI) of 60.0 to 69.9 in adult (Multi)   • Morbid obesity with BMI of 60.0-69.9, adult (Multi)       Past Medical History:   Past Medical History:   Diagnosis Date   • Personal history of other diseases of the circulatory system     History of hypertension   • Personal history of pneumonia (recurrent)     History of pneumonia       Surgical History:   Past Surgical History:   Procedure Laterality Date   • OTHER SURGICAL HISTORY  08/07/2019    Hernia  "repair   • OTHER SURGICAL HISTORY  08/07/2019    Vaginal sling procedure   • OTHER SURGICAL HISTORY  08/29/2019    Carpal tunnel surgery   • OTHER SURGICAL HISTORY  06/09/2019    Colonoscopy   • OTHER SURGICAL HISTORY  06/09/2019    Umbilical hernia repair   • OTHER SURGICAL HISTORY  06/09/2019    Tubal ligation bilateral   • OTHER SURGICAL HISTORY  06/09/2019    Partial cystectomy   • OTHER SURGICAL HISTORY  06/09/2019    Laparoscopic right hemicolectomy   • OTHER SURGICAL HISTORY  06/09/2019    Urethropexy        Allergies: No Known Allergies    Medications:   Current Outpatient Medications:   •  amLODIPine (Norvasc) 5 mg tablet, TAKE 1 TABLET DAILY, Disp: 90 tablet, Rfl: 1  •  celecoxib (CeleBREX) 200 mg capsule, TAKE 1 CAPSULE DAILY, Disp: 90 capsule, Rfl: 0  •  celecoxib (CeleBREX) 200 mg capsule, 1 capsule by mouth twice daily as needed 1 p.o., Disp: 180 capsule, Rfl: 4  •  cholecalciferol (Vitamin D-3) 25 MCG (1000 UT) tablet, Take 1 tablet (25 mcg) by mouth once daily., Disp: , Rfl:   •  hydroxychloroquine (Plaquenil) 200 mg tablet, TAKE 1 TABLET TWICE A DAY, Disp: 180 tablet, Rfl: 0  •  KRILL OIL ORAL, Take by mouth., Disp: , Rfl:   •  omega-3/dha/epa/fish oil (OMEGA-3 ORAL), Take by mouth. Take as directed, Disp: , Rfl:   •  telmisartan-hydrochlorothiazid (MIcarDIS HCT) 80-12.5 mg tablet, Take 1 tablet by mouth once daily., Disp: 90 tablet, Rfl: 1  •  vitamin E acetate (VITAMIN E ORAL), Take 1 tablet by mouth 1 (one) time each day., Disp: , Rfl:     Current Facility-Administered Medications:   •  triamcinolone acetonide (Zilretta) injection 32 mg, 32 mg, intra-articular, Once, Shannan Brown MD      Objective   Physical Examination:    Visit Vitals  BP (!) 174/92   Pulse 85   Ht 1.549 m (5' 0.98\")   Wt (!) 160 kg (352 lb 8 oz)   BMI 66.64 kg/m²   Smoking Status Never   BSA 2.62 m²        Gen: NAD, A&O x 3  HEENT: clear sclera and conjunctiva,     Musculoskeletal:   Neck; WNL, full ROM  Shoulder: WNL, full " ROM  Elbow:WNL, full ROM, no effusion noted  Wrist and fingers;no active synovitis noted, Full ROM in the Wrist , Good fist and   Knees:  No effusions or crepitation, full ROM.  Hips; WNL, full ROM, Negative Maycol test  Ankle, Feet; WNL, full ROM    Skin: No rashes or lesions seen, no nail changes  Neuro: A&O x3, Normal Gait    Procedures :None    Orders:  No orders of the defined types were placed in this encounter.       Provider Impression:   Assessment/Plan   No diagnosis found.       68-year-old female with Gout and seronegative arthropathy. She does have chronic lower back pain and chronic knee pain. X-ray in the past show severe knee degenerative arthritis worse in the medial area  -Continue with Celebrex 200 mg once daily, in allopurinol  use Tylenol as needed  -Continue Plaquenil 200 mg , she is only able to tolerate 1 tab. discussed sulfasalazine if patient has additional joint issues, patient satisfied with current regimen  -Continue with pain management for lower back pain and sciatica  -Continue allopurinol 300 mg daily as tolerated however if hair loss persist to be an issue for patient can consider alternative  -Follow-up in 6 months

## 2024-05-24 DIAGNOSIS — M1A.9XX0 CHRONIC GOUT WITHOUT TOPHUS, UNSPECIFIED CAUSE, UNSPECIFIED SITE: ICD-10-CM

## 2024-05-28 RX ORDER — ALLOPURINOL 300 MG/1
300 TABLET ORAL DAILY
Qty: 90 TABLET | Refills: 1 | Status: SHIPPED | OUTPATIENT
Start: 2024-05-28

## 2024-07-22 ENCOUNTER — APPOINTMENT (OUTPATIENT)
Dept: PRIMARY CARE | Facility: CLINIC | Age: 69
End: 2024-07-22
Payer: COMMERCIAL

## 2024-07-22 VITALS — SYSTOLIC BLOOD PRESSURE: 142 MMHG | DIASTOLIC BLOOD PRESSURE: 74 MMHG

## 2024-07-22 DIAGNOSIS — L98.9 SKIN LESION: ICD-10-CM

## 2024-07-22 DIAGNOSIS — M25.50 ARTHRALGIA, UNSPECIFIED JOINT: Primary | ICD-10-CM

## 2024-07-22 DIAGNOSIS — M54.9 CHRONIC BACK PAIN, UNSPECIFIED BACK LOCATION, UNSPECIFIED BACK PAIN LATERALITY: ICD-10-CM

## 2024-07-22 DIAGNOSIS — I10 HYPERTENSION, BENIGN: ICD-10-CM

## 2024-07-22 DIAGNOSIS — G89.29 CHRONIC BACK PAIN, UNSPECIFIED BACK LOCATION, UNSPECIFIED BACK PAIN LATERALITY: ICD-10-CM

## 2024-07-22 DIAGNOSIS — E66.01 CLASS 3 SEVERE OBESITY DUE TO EXCESS CALORIES WITH SERIOUS COMORBIDITY AND BODY MASS INDEX (BMI) OF 60.0 TO 69.9 IN ADULT (MULTI): ICD-10-CM

## 2024-07-22 DIAGNOSIS — G89.4 CHRONIC PAIN SYNDROME: ICD-10-CM

## 2024-07-22 PROCEDURE — 99215 OFFICE O/P EST HI 40 MIN: CPT | Performed by: STUDENT IN AN ORGANIZED HEALTH CARE EDUCATION/TRAINING PROGRAM

## 2024-07-22 PROCEDURE — 3077F SYST BP >= 140 MM HG: CPT | Performed by: STUDENT IN AN ORGANIZED HEALTH CARE EDUCATION/TRAINING PROGRAM

## 2024-07-22 PROCEDURE — 3078F DIAST BP <80 MM HG: CPT | Performed by: STUDENT IN AN ORGANIZED HEALTH CARE EDUCATION/TRAINING PROGRAM

## 2024-07-22 RX ORDER — TELMISARTAN AND HYDROCHLORTHIAZIDE 80; 25 MG/1; MG/1
1 TABLET ORAL DAILY
Qty: 90 TABLET | Refills: 3 | Status: SHIPPED | OUTPATIENT
Start: 2024-07-22 | End: 2025-07-22

## 2024-07-22 RX ORDER — PREDNISONE 5 MG/1
5 TABLET ORAL DAILY
Qty: 7 TABLET | Refills: 0 | Status: SHIPPED | OUTPATIENT
Start: 2024-07-22 | End: 2024-07-29

## 2024-07-22 NOTE — PROGRESS NOTES
Subjective   Patient ID: Marcella Muñoz is a 68 y.o. female who presents for Follow-up.    HPI   Routine fu.  Med refill.  BP has been a bit high.  Chronic pain persists, seeing pain management.  She feels like joint pain is flaring up, previously had improvement with short course of low dose steroids, asking for this.  She is asking for order for medical massage.    Review of Systems  12-point ROS reviewed and was negative unless otherwise noted in HPI.    Objective   /74     Physical Exam  GEN: conversant, NAD  HEENT: PERRL, EOMI, MMM  NECK: supple, full  CV: S1, S2, RRR  PULM: CTAB  ABD: obese  NEURO: no new gross focal deficits  EXT: no sig LE edema  PSYCH: appropriate affect    Assessment/Plan     #Hypertension  -continue telmisartan, amlodipine. Increase hydrochlorothiazide to 25mg daily, monitor metabolic panel    #Chronic LBP/sciatica: She is seeing pain management. Referral for massage therapy.     #Seronegative arthritis  #Hyperuricemia without dx of gout  -Continue allopurinol  -Continue Celebrex per rheumatology      #Severe obesity  #OA  -Patient has met with nutritionist in the past, she states that she lost 90 pounds on the keto diet, is now back on this diet  -Encourage lifestyle modifications, previously offered referral to aquatic therapy/physical therapy and nutritionist, but she declined   -may benefit from bariatric surgery for weight and to help with OA      #Obstructive sleep apnea, suspected  -Patient has multiple risk factors with regards to sleep apnea she also endorses that she snores at night  -She was offered sleep referral previously, but she declined      #ROD  -denied any complications, has never seen GI     #Arthralgias: start short course of low dose prednisone that has helped in the past.     #Health maintenance  -She follows with OB/GYN up-to-date on mammograms  -DEXA screening ordered  -follows with eye doctor annually   -Colonoscopy done in 2023, follows with   [facility]  -Advised yearly flu shot  -up-to-date on shingles vaccine  -PPSV23 given 2022     RTC 3-6 months

## 2024-08-13 DIAGNOSIS — I10 HYPERTENSION, BENIGN: ICD-10-CM

## 2024-08-13 RX ORDER — AMLODIPINE BESYLATE 5 MG/1
5 TABLET ORAL DAILY
Qty: 90 TABLET | Refills: 1 | Status: SHIPPED | OUTPATIENT
Start: 2024-08-13

## 2024-11-13 ENCOUNTER — APPOINTMENT (OUTPATIENT)
Dept: RHEUMATOLOGY | Facility: CLINIC | Age: 69
End: 2024-11-13
Payer: COMMERCIAL

## 2024-11-13 ENCOUNTER — LAB (OUTPATIENT)
Dept: LAB | Facility: LAB | Age: 69
End: 2024-11-13
Payer: COMMERCIAL

## 2024-11-13 VITALS
SYSTOLIC BLOOD PRESSURE: 136 MMHG | HEART RATE: 88 BPM | BODY MASS INDEX: 68.15 KG/M2 | DIASTOLIC BLOOD PRESSURE: 83 MMHG | WEIGHT: 293 LBS | RESPIRATION RATE: 20 BRPM

## 2024-11-13 DIAGNOSIS — G89.29 OTHER CHRONIC PAIN: ICD-10-CM

## 2024-11-13 DIAGNOSIS — M06.09 SERONEGATIVE ARTHROPATHY OF MULTIPLE SITES (MULTI): ICD-10-CM

## 2024-11-13 DIAGNOSIS — M1A.9XX0 CHRONIC GOUT WITHOUT TOPHUS, UNSPECIFIED CAUSE, UNSPECIFIED SITE: Primary | ICD-10-CM

## 2024-11-13 DIAGNOSIS — M1A.9XX0 CHRONIC GOUT WITHOUT TOPHUS, UNSPECIFIED CAUSE, UNSPECIFIED SITE: ICD-10-CM

## 2024-11-13 LAB
ALBUMIN SERPL BCP-MCNC: 4.4 G/DL (ref 3.4–5)
ALP SERPL-CCNC: 112 U/L (ref 33–136)
ALT SERPL W P-5'-P-CCNC: 21 U/L (ref 7–45)
ANION GAP SERPL CALC-SCNC: 19 MMOL/L (ref 10–20)
AST SERPL W P-5'-P-CCNC: 19 U/L (ref 9–39)
BASOPHILS # BLD AUTO: 0.13 X10*3/UL (ref 0–0.1)
BASOPHILS NFR BLD AUTO: 1.5 %
BILIRUB SERPL-MCNC: 0.9 MG/DL (ref 0–1.2)
BUN SERPL-MCNC: 23 MG/DL (ref 6–23)
CALCIUM SERPL-MCNC: 10.4 MG/DL (ref 8.6–10.6)
CHLORIDE SERPL-SCNC: 101 MMOL/L (ref 98–107)
CO2 SERPL-SCNC: 26 MMOL/L (ref 21–32)
CREAT SERPL-MCNC: 0.74 MG/DL (ref 0.5–1.05)
CRP SERPL-MCNC: 0.51 MG/DL
EGFRCR SERPLBLD CKD-EPI 2021: 88 ML/MIN/1.73M*2
EOSINOPHIL # BLD AUTO: 0.23 X10*3/UL (ref 0–0.7)
EOSINOPHIL NFR BLD AUTO: 2.6 %
ERYTHROCYTE [DISTWIDTH] IN BLOOD BY AUTOMATED COUNT: 14.4 % (ref 11.5–14.5)
ERYTHROCYTE [SEDIMENTATION RATE] IN BLOOD BY WESTERGREN METHOD: 14 MM/H (ref 0–30)
GLUCOSE SERPL-MCNC: 88 MG/DL (ref 74–99)
HCT VFR BLD AUTO: 45.1 % (ref 36–46)
HGB BLD-MCNC: 14.2 G/DL (ref 12–16)
IMM GRANULOCYTES # BLD AUTO: 0.04 X10*3/UL (ref 0–0.7)
IMM GRANULOCYTES NFR BLD AUTO: 0.4 % (ref 0–0.9)
LYMPHOCYTES # BLD AUTO: 2.54 X10*3/UL (ref 1.2–4.8)
LYMPHOCYTES NFR BLD AUTO: 28.6 %
MCH RBC QN AUTO: 28.3 PG (ref 26–34)
MCHC RBC AUTO-ENTMCNC: 31.5 G/DL (ref 32–36)
MCV RBC AUTO: 90 FL (ref 80–100)
MONOCYTES # BLD AUTO: 0.67 X10*3/UL (ref 0.1–1)
MONOCYTES NFR BLD AUTO: 7.5 %
NEUTROPHILS # BLD AUTO: 5.28 X10*3/UL (ref 1.2–7.7)
NEUTROPHILS NFR BLD AUTO: 59.4 %
NRBC BLD-RTO: 0 /100 WBCS (ref 0–0)
PLATELET # BLD AUTO: 380 X10*3/UL (ref 150–450)
POTASSIUM SERPL-SCNC: 4.5 MMOL/L (ref 3.5–5.3)
PROT SERPL-MCNC: 7.5 G/DL (ref 6.4–8.2)
RBC # BLD AUTO: 5.01 X10*6/UL (ref 4–5.2)
SODIUM SERPL-SCNC: 141 MMOL/L (ref 136–145)
URATE SERPL-MCNC: 4.5 MG/DL (ref 2.3–6.7)
WBC # BLD AUTO: 8.9 X10*3/UL (ref 4.4–11.3)

## 2024-11-13 PROCEDURE — 99214 OFFICE O/P EST MOD 30 MIN: CPT | Performed by: INTERNAL MEDICINE

## 2024-11-13 PROCEDURE — 80053 COMPREHEN METABOLIC PANEL: CPT

## 2024-11-13 PROCEDURE — 1036F TOBACCO NON-USER: CPT | Performed by: INTERNAL MEDICINE

## 2024-11-13 PROCEDURE — 36415 COLL VENOUS BLD VENIPUNCTURE: CPT

## 2024-11-13 PROCEDURE — 3079F DIAST BP 80-89 MM HG: CPT | Performed by: INTERNAL MEDICINE

## 2024-11-13 PROCEDURE — 84550 ASSAY OF BLOOD/URIC ACID: CPT

## 2024-11-13 PROCEDURE — 85025 COMPLETE CBC W/AUTO DIFF WBC: CPT

## 2024-11-13 PROCEDURE — 85652 RBC SED RATE AUTOMATED: CPT

## 2024-11-13 PROCEDURE — 86140 C-REACTIVE PROTEIN: CPT

## 2024-11-13 PROCEDURE — 1160F RVW MEDS BY RX/DR IN RCRD: CPT | Performed by: INTERNAL MEDICINE

## 2024-11-13 PROCEDURE — 1125F AMNT PAIN NOTED PAIN PRSNT: CPT | Performed by: INTERNAL MEDICINE

## 2024-11-13 PROCEDURE — 3075F SYST BP GE 130 - 139MM HG: CPT | Performed by: INTERNAL MEDICINE

## 2024-11-13 PROCEDURE — 1159F MED LIST DOCD IN RCRD: CPT | Performed by: INTERNAL MEDICINE

## 2024-11-13 RX ORDER — CELECOXIB 200 MG/1
200 CAPSULE ORAL DAILY
Qty: 90 CAPSULE | Refills: 0 | Status: SHIPPED | OUTPATIENT
Start: 2024-11-13

## 2024-11-13 RX ORDER — MIRABEGRON 50 MG/1
TABLET, FILM COATED, EXTENDED RELEASE ORAL
COMMUNITY
Start: 2024-10-23

## 2024-11-13 RX ORDER — OXYBUTYNIN CHLORIDE 15 MG/1
TABLET, EXTENDED RELEASE ORAL
COMMUNITY
Start: 2024-10-25

## 2024-11-13 RX ORDER — DULOXETIN HYDROCHLORIDE 20 MG/1
CAPSULE, DELAYED RELEASE ORAL
Qty: 97 CAPSULE | Refills: 0 | Status: SHIPPED | OUTPATIENT
Start: 2024-11-13 | End: 2025-01-04

## 2024-11-13 RX ORDER — ALLOPURINOL 300 MG/1
300 TABLET ORAL DAILY
Qty: 90 TABLET | Refills: 1 | Status: SHIPPED | OUTPATIENT
Start: 2024-11-13

## 2024-11-13 RX ORDER — HYDROXYCHLOROQUINE SULFATE 200 MG/1
200 TABLET, FILM COATED ORAL 2 TIMES DAILY
Qty: 180 TABLET | Refills: 1 | Status: SHIPPED | OUTPATIENT
Start: 2024-11-13

## 2024-11-13 ASSESSMENT — PAIN SCALES - GENERAL: PAINLEVEL_OUTOF10: 4

## 2024-11-13 NOTE — PROGRESS NOTES
Follow-up Rheumatology Patient Visit    Chief Complaint:  Marcella Muñoz is a 69 y.o. female presenting today for Follow-up.    History of Presenting Problem:   68 y/o female with Gout and seronegative Arthropathy present for f/u.  She is currently on Celebrex 200 mg and allopurinol 300 mg daily.  However patient decided allopurinol was causing hair loss over the years and stopped this recently she has been off of it for about a month.   She continues to deal with chronic knee pain and sciatica. She is having issues with mobility due to her knee. She has been in pain management, steroid injection did not help.     Problem List:   Patient Active Problem List   Diagnosis    Alkaline phosphatase elevation    Arthropathy    Carpal tunnel syndrome of left wrist    Carpal tunnel syndrome of right wrist    Chronic pain of both knees    Decreased hearing of both ears    Diverticulosis of colon    DJD (degenerative joint disease) of knee    Endogenous hyperlipidemia    Fall    Gout    Hyperuricemia    Hypertension, benign    Knee pain    Left rotator cuff tear arthropathy    Left shoulder pain    Low back pain    Low serum vitamin D    NAFLD (nonalcoholic fatty liver disease)    Pain in both upper extremities    Polyarthritis, inflammatory (Multi)    Seronegative arthropathy of multiple sites (Multi)    Suspected sleep apnea    Vitamin D deficiency    Class 3 severe obesity due to excess calories with serious comorbidity and body mass index (BMI) of 60.0 to 69.9 in adult    Morbid obesity with BMI of 60.0-69.9, adult (Multi)       Past Medical History:   Past Medical History:   Diagnosis Date    Personal history of other diseases of the circulatory system     History of hypertension    Personal history of pneumonia (recurrent)     History of pneumonia       Surgical History:   Past Surgical History:   Procedure Laterality Date    OTHER SURGICAL HISTORY  08/07/2019    Hernia repair    OTHER SURGICAL HISTORY  08/07/2019     Vaginal sling procedure    OTHER SURGICAL HISTORY  08/29/2019    Carpal tunnel surgery    OTHER SURGICAL HISTORY  06/09/2019    Colonoscopy    OTHER SURGICAL HISTORY  06/09/2019    Umbilical hernia repair    OTHER SURGICAL HISTORY  06/09/2019    Tubal ligation bilateral    OTHER SURGICAL HISTORY  06/09/2019    Partial cystectomy    OTHER SURGICAL HISTORY  06/09/2019    Laparoscopic right hemicolectomy    OTHER SURGICAL HISTORY  06/09/2019    Urethropexy        Allergies: No Known Allergies    Medications:   Current Outpatient Medications:     amLODIPine (Norvasc) 5 mg tablet, TAKE 1 TABLET DAILY, Disp: 90 tablet, Rfl: 1    cholecalciferol (Vitamin D-3) 25 MCG (1000 UT) tablet, Take 1 tablet (25 mcg) by mouth once daily., Disp: , Rfl:     KRILL OIL ORAL, Take by mouth., Disp: , Rfl:     mirabegron (Myrbetriq) 50 mg tablet extended release 24 hr 24 hr tablet, , Disp: , Rfl:     omega-3/dha/epa/fish oil (OMEGA-3 ORAL), Take by mouth. Take as directed, Disp: , Rfl:     oxybutynin XL (Ditropan-XL) 15 mg 24 hr tablet, , Disp: , Rfl:     telmisartan-hydrochlorothiazid (MIcarDIS HCT) 80-25 mg tablet, Take 1 tablet by mouth once daily., Disp: 90 tablet, Rfl: 3    vitamin E acetate (VITAMIN E ORAL), Take 1 tablet by mouth 1 (one) time each day., Disp: , Rfl:     allopurinol (Zyloprim) 300 mg tablet, Take 1 tablet (300 mg) by mouth once daily., Disp: 90 tablet, Rfl: 1    celecoxib (CeleBREX) 200 mg capsule, Take 1 capsule (200 mg) by mouth once daily., Disp: 90 capsule, Rfl: 0    DULoxetine (Cymbalta) 20 mg DR capsule, Take 1 capsule (20 mg) by mouth once daily for 7 days, THEN 2 capsules (40 mg) once daily. Do not crush or chew.., Disp: 97 capsule, Rfl: 0    hydroxychloroquine (Plaquenil) 200 mg tablet, Take 1 tablet (200 mg) by mouth 2 times a day., Disp: 180 tablet, Rfl: 1    Current Facility-Administered Medications:     triamcinolone acetonide (Zilretta) injection 32 mg, 32 mg, intra-articular, Once, Kutaiba Tabbaa,  MD      Objective   Physical Examination:    Visit Vitals  /83   Pulse 88   Resp 20   Wt (!) 164 kg (360 lb 8 oz)   BMI 68.15 kg/m²   Smoking Status Never   BSA 2.66 m²        Gen: NAD, A&O x 3  HEENT: clear sclera and conjunctiva,     Musculoskeletal:   Neck; WNL, full ROM  Shoulder: WNL, full ROM  Elbow:WNL, full ROM, no effusion noted  Wrist and fingers;no active synovitis noted, Full ROM in the Wrist , Good fist and   Knees:  No effusions or crepitation, full ROM.  Hips; WNL, full ROM, Negative Maycol test  Ankle, Feet; WNL, full ROM    Skin: No rashes or lesions seen, no nail changes  Neuro: A&O x3, Normal Gait    Procedures :None    Orders:  Orders Placed This Encounter   Procedures    CBC and Auto Differential    Comprehensive Metabolic Panel    C-Reactive Protein    Sedimentation Rate    Uric Acid        Provider Impression:   Assessment/Plan   Encounter Diagnoses   Name Primary?    Chronic gout without tophus, unspecified cause, unspecified site Yes    Seronegative arthropathy of multiple sites (Multi)     Other chronic pain           69-year-old female with Gout and seronegative arthropathy. She does have chronic lower back pain and chronic knee pain. X-ray in the past show severe knee degenerative arthritis worse in the medial area  -Continue allopurinol 300 mg daily as tolerated   -Continue with Celebrex 200 mg once daily, Use Tylenol as needed  -Continue Plaquenil 200 mg twice a day patient is up-to-date with eye exams  -After discussion about her chronic pain issues recommended patient consider duloxetine as a treatment for chronic pain patient is reluctant to try this however she is willing to take a trial and discuss further with her PCP  -Continue with pain management for lower back pain and sciatica  -Follow-up in 6 months

## 2024-12-12 DIAGNOSIS — M1A.9XX0 CHRONIC GOUT WITHOUT TOPHUS, UNSPECIFIED CAUSE, UNSPECIFIED SITE: ICD-10-CM

## 2024-12-12 RX ORDER — ALLOPURINOL 300 MG/1
300 TABLET ORAL DAILY
Qty: 90 TABLET | Refills: 0 | Status: SHIPPED | OUTPATIENT
Start: 2024-12-12

## 2025-01-14 ENCOUNTER — APPOINTMENT (OUTPATIENT)
Dept: RHEUMATOLOGY | Facility: CLINIC | Age: 70
End: 2025-01-14
Payer: COMMERCIAL

## 2025-01-22 ENCOUNTER — APPOINTMENT (OUTPATIENT)
Dept: PRIMARY CARE | Facility: CLINIC | Age: 70
End: 2025-01-22
Payer: COMMERCIAL

## 2025-01-23 ENCOUNTER — APPOINTMENT (OUTPATIENT)
Dept: PRIMARY CARE | Facility: CLINIC | Age: 70
End: 2025-01-23
Payer: COMMERCIAL

## 2025-01-23 VITALS — DIASTOLIC BLOOD PRESSURE: 67 MMHG | SYSTOLIC BLOOD PRESSURE: 135 MMHG

## 2025-01-23 DIAGNOSIS — G89.29 CHRONIC BACK PAIN, UNSPECIFIED BACK LOCATION, UNSPECIFIED BACK PAIN LATERALITY: ICD-10-CM

## 2025-01-23 DIAGNOSIS — E66.813 CLASS 3 SEVERE OBESITY DUE TO EXCESS CALORIES WITH SERIOUS COMORBIDITY AND BODY MASS INDEX (BMI) OF 60.0 TO 69.9 IN ADULT: ICD-10-CM

## 2025-01-23 DIAGNOSIS — Z12.31 ENCOUNTER FOR SCREENING MAMMOGRAM FOR MALIGNANT NEOPLASM OF BREAST: ICD-10-CM

## 2025-01-23 DIAGNOSIS — I10 HYPERTENSION, BENIGN: ICD-10-CM

## 2025-01-23 DIAGNOSIS — M25.50 ARTHRALGIA, UNSPECIFIED JOINT: ICD-10-CM

## 2025-01-23 DIAGNOSIS — M06.09 SERONEGATIVE ARTHROPATHY OF MULTIPLE SITES (MULTI): ICD-10-CM

## 2025-01-23 DIAGNOSIS — M54.9 CHRONIC BACK PAIN, UNSPECIFIED BACK LOCATION, UNSPECIFIED BACK PAIN LATERALITY: ICD-10-CM

## 2025-01-23 DIAGNOSIS — G89.4 CHRONIC PAIN SYNDROME: Primary | ICD-10-CM

## 2025-01-23 DIAGNOSIS — E66.01 CLASS 3 SEVERE OBESITY DUE TO EXCESS CALORIES WITH SERIOUS COMORBIDITY AND BODY MASS INDEX (BMI) OF 60.0 TO 69.9 IN ADULT: ICD-10-CM

## 2025-01-23 PROCEDURE — 99214 OFFICE O/P EST MOD 30 MIN: CPT | Performed by: STUDENT IN AN ORGANIZED HEALTH CARE EDUCATION/TRAINING PROGRAM

## 2025-01-23 PROCEDURE — 3075F SYST BP GE 130 - 139MM HG: CPT | Performed by: STUDENT IN AN ORGANIZED HEALTH CARE EDUCATION/TRAINING PROGRAM

## 2025-01-23 PROCEDURE — 3078F DIAST BP <80 MM HG: CPT | Performed by: STUDENT IN AN ORGANIZED HEALTH CARE EDUCATION/TRAINING PROGRAM

## 2025-01-23 PROCEDURE — 1159F MED LIST DOCD IN RCRD: CPT | Performed by: STUDENT IN AN ORGANIZED HEALTH CARE EDUCATION/TRAINING PROGRAM

## 2025-01-23 RX ORDER — TELMISARTAN AND HYDROCHLORTHIAZIDE 80; 25 MG/1; MG/1
1 TABLET ORAL DAILY
Qty: 90 TABLET | Refills: 3 | Status: SHIPPED | OUTPATIENT
Start: 2025-01-23 | End: 2026-01-23

## 2025-01-23 RX ORDER — AMLODIPINE BESYLATE 5 MG/1
5 TABLET ORAL DAILY
Qty: 90 TABLET | Refills: 1 | Status: SHIPPED | OUTPATIENT
Start: 2025-01-23

## 2025-01-23 RX ORDER — DULOXETIN HYDROCHLORIDE 60 MG/1
60 CAPSULE, DELAYED RELEASE ORAL DAILY
Qty: 90 CAPSULE | Refills: 1 | Status: SHIPPED | OUTPATIENT
Start: 2025-01-23 | End: 2025-07-22

## 2025-01-23 RX ORDER — DULOXETIN HYDROCHLORIDE 30 MG/1
30 CAPSULE, DELAYED RELEASE ORAL DAILY
Qty: 30 CAPSULE | Refills: 0 | Status: SHIPPED | OUTPATIENT
Start: 2025-01-23 | End: 2025-02-22

## 2025-01-23 NOTE — PROGRESS NOTES
Subjective   Patient ID: Marcella Muñoz is a 69 y.o. female who presents for Follow-up and med refills.    HPI   Routine fu.    Chronic pain persists. She has been seeing rheumatology and pain management. She recently started trial of duloxetine for chronic pain, thinks this was helpful, but stopped taking the medication. She is interested in restarting it.    Hydrochlorothiazide dose was increased at our last visit, BP has improved.    Review of Systems  12-point ROS reviewed and was negative unless otherwise noted in HPI.    Objective   /67     Physical Exam  GEN: conversant, NAD  HEENT: PERRL, EOMI, MMM  NECK: supple, full  CV: S1, S2, RRR  PULM: CTAB  ABD: soft, NT, obese  NEURO: no new gross focal deficits  EXT: no sig LE edema  PSYCH: appropriate affect    Assessment/Plan     #Seronegative arthritis  #gout  #Chronic pain syndrome  -restart duloxetine, discussed SE profile. Take 30mg daily for 1 week, then increase dose to 60mg daily  -Continue allopurinol  -Continue Celebrex per rheumatology     #Hypertension  -continue telmisartan, amlodipine, hydrochlorothiazide      #Chronic LBP/sciatica: She is seeing pain management. Referral for massage therapy.     #Severe obesity  #OA  -Patient has met with nutritionist in the past, she states that she lost 90 pounds on the keto diet, is now back on this diet  -Encourage lifestyle modifications, previously offered referral to aquatic therapy/physical therapy and nutritionist, but she declined   -may benefit from bariatric surgery for weight and to help with OA      #Obstructive sleep apnea, suspected  -Patient has multiple risk factors with regards to sleep apnea she also endorses that she snores at night  -She was offered sleep referral previously, but she declined      #ROD  -denied any complications, advised GI eval     #Health maintenance  -She follows with OB/GYN, mammogram ordered  -DEXA screening ordered  -follows with eye doctor annually    -Colonoscopy done in 2023, follows with Dr. Alonso  -Advised yearly flu shot  -up-to-date on shingles vaccine  -PPSV23 given 2022     RTC 3 months

## 2025-02-12 DIAGNOSIS — M06.09 SERONEGATIVE ARTHROPATHY OF MULTIPLE SITES (MULTI): ICD-10-CM

## 2025-02-13 RX ORDER — CELECOXIB 200 MG/1
200 CAPSULE ORAL 2 TIMES DAILY PRN
Qty: 180 CAPSULE | Refills: 3 | Status: SHIPPED | OUTPATIENT
Start: 2025-02-13

## 2025-02-19 DIAGNOSIS — G89.4 CHRONIC PAIN SYNDROME: ICD-10-CM

## 2025-02-19 RX ORDER — DULOXETIN HYDROCHLORIDE 30 MG/1
30 CAPSULE, DELAYED RELEASE ORAL DAILY
Qty: 90 CAPSULE | Refills: 0 | Status: SHIPPED | OUTPATIENT
Start: 2025-02-19 | End: 2025-05-20

## 2025-03-12 DIAGNOSIS — M1A.9XX0 CHRONIC GOUT WITHOUT TOPHUS, UNSPECIFIED CAUSE, UNSPECIFIED SITE: ICD-10-CM

## 2025-03-12 RX ORDER — ALLOPURINOL 300 MG/1
300 TABLET ORAL DAILY
Qty: 90 TABLET | Refills: 0 | Status: SHIPPED | OUTPATIENT
Start: 2025-03-12

## 2025-03-29 DIAGNOSIS — G89.4 CHRONIC PAIN SYNDROME: ICD-10-CM

## 2025-04-07 RX ORDER — DULOXETIN HYDROCHLORIDE 30 MG/1
CAPSULE, DELAYED RELEASE ORAL
Refills: 0 | OUTPATIENT
Start: 2025-04-07

## 2025-04-18 ENCOUNTER — TELEPHONE (OUTPATIENT)
Dept: PRIMARY CARE | Facility: CLINIC | Age: 70
End: 2025-04-18
Payer: COMMERCIAL

## 2025-04-24 ENCOUNTER — APPOINTMENT (OUTPATIENT)
Dept: PRIMARY CARE | Facility: CLINIC | Age: 70
End: 2025-04-24
Payer: COMMERCIAL

## 2025-04-24 VITALS — SYSTOLIC BLOOD PRESSURE: 136 MMHG | DIASTOLIC BLOOD PRESSURE: 84 MMHG

## 2025-04-24 DIAGNOSIS — E78.2 MIXED HYPERLIPIDEMIA: ICD-10-CM

## 2025-04-24 DIAGNOSIS — I10 PRIMARY HYPERTENSION: ICD-10-CM

## 2025-04-24 DIAGNOSIS — M06.09 SERONEGATIVE ARTHROPATHY OF MULTIPLE SITES (MULTI): ICD-10-CM

## 2025-04-24 DIAGNOSIS — G89.29 CHRONIC BACK PAIN, UNSPECIFIED BACK LOCATION, UNSPECIFIED BACK PAIN LATERALITY: ICD-10-CM

## 2025-04-24 DIAGNOSIS — E66.01 CLASS 3 SEVERE OBESITY DUE TO EXCESS CALORIES WITH SERIOUS COMORBIDITY AND BODY MASS INDEX (BMI) OF 60.0 TO 69.9 IN ADULT: ICD-10-CM

## 2025-04-24 DIAGNOSIS — E66.813 CLASS 3 SEVERE OBESITY DUE TO EXCESS CALORIES WITH SERIOUS COMORBIDITY AND BODY MASS INDEX (BMI) OF 60.0 TO 69.9 IN ADULT: ICD-10-CM

## 2025-04-24 DIAGNOSIS — M54.9 CHRONIC BACK PAIN, UNSPECIFIED BACK LOCATION, UNSPECIFIED BACK PAIN LATERALITY: ICD-10-CM

## 2025-04-24 DIAGNOSIS — G89.4 CHRONIC PAIN SYNDROME: Primary | ICD-10-CM

## 2025-04-24 DIAGNOSIS — I10 HYPERTENSION, BENIGN: ICD-10-CM

## 2025-04-24 PROCEDURE — 99215 OFFICE O/P EST HI 40 MIN: CPT | Performed by: STUDENT IN AN ORGANIZED HEALTH CARE EDUCATION/TRAINING PROGRAM

## 2025-04-24 PROCEDURE — 1036F TOBACCO NON-USER: CPT | Performed by: STUDENT IN AN ORGANIZED HEALTH CARE EDUCATION/TRAINING PROGRAM

## 2025-04-24 PROCEDURE — 3079F DIAST BP 80-89 MM HG: CPT | Performed by: STUDENT IN AN ORGANIZED HEALTH CARE EDUCATION/TRAINING PROGRAM

## 2025-04-24 PROCEDURE — 3075F SYST BP GE 130 - 139MM HG: CPT | Performed by: STUDENT IN AN ORGANIZED HEALTH CARE EDUCATION/TRAINING PROGRAM

## 2025-04-24 RX ORDER — TELMISARTAN AND HYDROCHLOROTHIAZIDE 25; 80 MG/1; MG/1
1 TABLET ORAL DAILY
Qty: 90 TABLET | Refills: 1 | Status: SHIPPED | OUTPATIENT
Start: 2025-04-24 | End: 2026-04-24

## 2025-04-24 RX ORDER — AMLODIPINE BESYLATE 5 MG/1
5 TABLET ORAL DAILY
Qty: 90 TABLET | Refills: 1 | Status: SHIPPED | OUTPATIENT
Start: 2025-04-24

## 2025-04-24 RX ORDER — DULOXETIN HYDROCHLORIDE 30 MG/1
30 CAPSULE, DELAYED RELEASE ORAL DAILY
Qty: 30 CAPSULE | Refills: 0 | Status: SHIPPED | OUTPATIENT
Start: 2025-04-24 | End: 2025-05-24

## 2025-04-24 ASSESSMENT — PATIENT HEALTH QUESTIONNAIRE - PHQ9
SUM OF ALL RESPONSES TO PHQ9 QUESTIONS 1 AND 2: 0
2. FEELING DOWN, DEPRESSED OR HOPELESS: NOT AT ALL
1. LITTLE INTEREST OR PLEASURE IN DOING THINGS: NOT AT ALL

## 2025-04-24 NOTE — PROGRESS NOTES
Subjective   Patient ID: Marcella Muñoz is a 69 y.o. female who presents for Follow-up.    HPI   Routine fu.    Med refill.    She has chronic LBP and sciatica, this is flaring up.    She is asking for note for jury duty.  Review of Systems  12-point ROS reviewed and was negative unless otherwise noted in HPI.    Objective   /84     Physical Exam  GEN: conversant, NAD  HEENT: PERRL, EOMI, MMM  NECK: supple, no carotid bruits appreciated b/l  CV: S1, S2, RRR  PULM: CTAB  ABD: soft, NT, ND  NEURO: no new gross focal deficits  EXT: no sig LE edema  PSYCH: appropriate affect    Assessment/Plan     #Seronegative arthritis  #gout  #Chronic pain syndrome  -continue duloxetine  -Continue allopurinol  -Continue Celebrex per rheumatology      #Hypertension  -continue telmisartan, amlodipine, hydrochlorothiazide      #Chronic LBP/sciatica: She is seeing pain management. Referral for massage therapy. Gave note for jury duty.     #Severe obesity  #OA  -Patient has met with nutritionist in the past, she states that she lost 90 pounds on the keto diet, is now back on this diet  -Encourage lifestyle modifications, previously offered referral to aquatic therapy/physical therapy and nutritionist, but she declined   -may benefit from bariatric surgery for weight and to help with OA      #Obstructive sleep apnea, suspected  -Patient has multiple risk factors with regards to sleep apnea she also endorses that she snores at night  -She was offered sleep referral previously, but she declined      #ROD  -denied any complications, advised GI eval     #Health maintenance  -She follows with OB/GYN, mammogram ordered  -DEXA screening ordered  -follows with eye doctor annually   -Colonoscopy done in 2023, follows with Dr. Alonso  -Advised yearly flu shot  -up-to-date on shingles vaccine  -PPSV23 given 2022     RTC 3 months

## 2025-04-25 LAB
ALBUMIN SERPL-MCNC: 4.3 G/DL (ref 3.6–5.1)
ALP SERPL-CCNC: 120 U/L (ref 37–153)
ALT SERPL-CCNC: 20 U/L (ref 6–29)
ANION GAP SERPL CALCULATED.4IONS-SCNC: 12 MMOL/L (CALC) (ref 7–17)
AST SERPL-CCNC: 18 U/L (ref 10–35)
BILIRUB SERPL-MCNC: 0.8 MG/DL (ref 0.2–1.2)
BUN SERPL-MCNC: 24 MG/DL (ref 7–25)
CALCIUM SERPL-MCNC: 10.2 MG/DL (ref 8.6–10.4)
CHLORIDE SERPL-SCNC: 102 MMOL/L (ref 98–110)
CHOLEST SERPL-MCNC: 193 MG/DL
CHOLEST/HDLC SERPL: 3 (CALC)
CO2 SERPL-SCNC: 26 MMOL/L (ref 20–32)
CREAT SERPL-MCNC: 0.74 MG/DL (ref 0.5–1.05)
EGFRCR SERPLBLD CKD-EPI 2021: 88 ML/MIN/1.73M2
ERYTHROCYTE [DISTWIDTH] IN BLOOD BY AUTOMATED COUNT: 14.1 % (ref 11–15)
GLUCOSE SERPL-MCNC: 89 MG/DL (ref 65–99)
HCT VFR BLD AUTO: 42.3 % (ref 35–45)
HDLC SERPL-MCNC: 64 MG/DL
HGB BLD-MCNC: 13.9 G/DL (ref 11.7–15.5)
LDLC SERPL CALC-MCNC: 102 MG/DL (CALC)
MCH RBC QN AUTO: 29 PG (ref 27–33)
MCHC RBC AUTO-ENTMCNC: 32.9 G/DL (ref 32–36)
MCV RBC AUTO: 88.3 FL (ref 80–100)
NONHDLC SERPL-MCNC: 129 MG/DL (CALC)
PLATELET # BLD AUTO: 361 THOUSAND/UL (ref 140–400)
PMV BLD REES-ECKER: 10 FL (ref 7.5–12.5)
POTASSIUM SERPL-SCNC: 4.9 MMOL/L (ref 3.5–5.3)
PROT SERPL-MCNC: 7 G/DL (ref 6.1–8.1)
RBC # BLD AUTO: 4.79 MILLION/UL (ref 3.8–5.1)
SODIUM SERPL-SCNC: 140 MMOL/L (ref 135–146)
TRIGL SERPL-MCNC: 176 MG/DL
WBC # BLD AUTO: 8.8 THOUSAND/UL (ref 3.8–10.8)

## 2025-05-05 NOTE — H&P (VIEW-ONLY)
You SUBJECTIVE:  This is 69 y.o.  female with PMH of morbid obesity BMI 68, arthritis worse in the knee, hypertension, seronegative arthropathy with multiple sites started on the methylprednisolone by Dr. Horn and on Plaquenil and Celebrex and get Synvisc injection and SI injection from us last injection was February 2024 with good benefit who is here for follow-up stating that her pain is getting worse specially when her right SI joint.  The patient has also a lot of pain all over her body.  She does not feel the Celebrex is helping much.  I do not use the Celebrex 2 of them together and just to see how much relief she gets and how long the relief lasts usually that would be enough for her for 2 or 3 days if she needed twice a day she can take it but she should not take it more than 2 days in a row because that may harm her kidneys.  The patient agreed to opioid sparing infusion and we are going to schedule her and the order placed today.  Will plan on right SI joint as well      Prior office visit:  1/18/2024: This is 68 y.o.  female with PMH of history of morbid obesity BMI 61, arthritis worse in the knee, hypertension, seronegative arthropathy with multiple sites started on the methylprednisolone by Dr. Horn and on Plaquenil and Celebrex and get Synvisc injection from orthopedics in her knees presenting with right hip and lower back pain that radiate to her right groin in addition to left knee end-stage arthritis pain and left shoulder that radiated to her left arm in addition to right hip arthritis and right SI joint pain.  The patient received left shoulder intra-articular injection under fluoroscopy in addition to left knee Zilretta injection and right SI joint injection who is here for follow-up stating the Zilretta injection did not help with her knee pain the same thing with her shoulder injection but she had a great response to her right SI joint injection.  The patient stated that she used to get gel  injection from orthopedics and she is asking if we can do that.  I plan for left knee Synvisc one injection under fluoroscopy guidance.                Procedures:  2/12/2024 right SI joint injection patient has had 90% improvement in pain and function  1/23/2024 left knee Synvisc injection patient has had 90% improvement in pain function  11/13/2023 left shoulder intra-articular injection under fluoroscopy the patient has had a 0% improvement in pain and function  9/3/2023 left knee Zilretta injection patient has had 0% improvement in pain and function  8/7/2023 right SI injection patient has had 100% improvement in pain and function        Portions of record reviewed for pertinent issues: active problem list, medication list, allergies, family history, social history, notes from last encounter, encounters, lab results, imaging and other available records.  I have personally reviewed the [facility] report for this patient. This report is scanned into the electronic medical record. I have considered the risks of abuse, dependence, addiction and diversion. It showed no controlled substance  OPIOID RISK ASSESSMENT SCORE 2/26  Aberrant behavior: None     Pending law suits: Works as   Social History:  lives with her  has 2 adult children and 4 grandchildren finished high school denies smoking drinking or use of illicit drugs                 Diagnostic studies:  5/18/2022 left shoulder x-ray showed mild AC osteoarthritis  2/8/2022 left knee x-ray showed advanced osteoarthritis  6/11/2019 C-spine x-ray showed mild degenerative changes         Review of Systems   HENT: Negative.     Eyes: Negative.    Respiratory: Negative.     Cardiovascular: Negative.    Gastrointestinal: Negative.    Endocrine: Negative.    Genitourinary: Negative.    Musculoskeletal:  Positive for arthralgias, back pain, gait problem and myalgias.   Skin: Negative.    Hematological: Negative.    Psychiatric/Behavioral: Negative.           Physical Exam  Vitals and nursing note reviewed.   Constitutional:       Appearance: Normal appearance.       HENT:      Head: Normocephalic and atraumatic.      Nose: Nose normal.   Eyes:      Extraocular Movements: Extraocular movements intact.      Conjunctiva/sclera: Conjunctivae normal.      Pupils: Pupils are equal, round, and reactive to light.   Cardiovascular:      Rate and Rhythm: Normal rate and regular rhythm.      Pulses: Normal pulses.      Heart sounds: Normal heart sounds.   Pulmonary:      Effort: Pulmonary effort is normal.      Breath sounds: Normal breath sounds.   Abdominal:      General: Abdomen is flat. Bowel sounds are normal.      Palpations: Abdomen is soft.   Musculoskeletal:         General: Tenderness present.   Skin:     General: Skin is warm.   Neurological:      General: No focal deficit present.      Mental Status: She is alert and oriented to person, place, and time.   Psychiatric:         Mood and Affect: Mood normal.         Behavior: Behavior normal.                      Plan  At least 50% of the visit was involved in the discussion of the options for treatment. We discussed exercises, medication, interventional therapies and surgery. Healthy life style is essential with patient hard work to achieve the wellness. In addition; discussion with the patient and/or family about any of the diagnostic results, impressions and/or recommended diagnostic studies, prognosis, risks and benefits of treatment options, instructions for treatment and/or follow-up, importance of compliance with chosen treatment options, risk-factor reduction, and patient/family education.         Continue self-directed physical therapy at least daily exercises for minimum of 20-minute  Right SI joint injection under fluoroscopy guidance  Opioid sparing infusion once every 2 weeks  Healthy lifestyle and anti-inflammatory diet in addition to weight control discussed with the patient  Alternative chronic pain  therapies was discussed, encouraged and information was handed  Return to Clinic after 3 months     *Please note this report has been produced using speech recognition software and may contain errors related to that system including grammar, punctuation and spelling as well as words and phrases that may be inappropriate. If there are questions or concerns, please feel free to contact me to clarify.    Shannan Brown MD

## 2025-05-06 ENCOUNTER — OFFICE VISIT (OUTPATIENT)
Dept: PAIN MEDICINE | Facility: CLINIC | Age: 70
End: 2025-05-06
Payer: COMMERCIAL

## 2025-05-06 VITALS
OXYGEN SATURATION: 94 % | RESPIRATION RATE: 18 BRPM | SYSTOLIC BLOOD PRESSURE: 137 MMHG | BODY MASS INDEX: 68.06 KG/M2 | HEART RATE: 80 BPM | TEMPERATURE: 97.7 F | WEIGHT: 293 LBS | DIASTOLIC BLOOD PRESSURE: 72 MMHG

## 2025-05-06 DIAGNOSIS — M46.1 SACROILIITIS (CMS-HCC): ICD-10-CM

## 2025-05-06 DIAGNOSIS — M79.7 FIBROMYALGIA: Primary | ICD-10-CM

## 2025-05-06 PROCEDURE — 3075F SYST BP GE 130 - 139MM HG: CPT | Performed by: ANESTHESIOLOGY

## 2025-05-06 PROCEDURE — 1159F MED LIST DOCD IN RCRD: CPT | Performed by: ANESTHESIOLOGY

## 2025-05-06 PROCEDURE — 3078F DIAST BP <80 MM HG: CPT | Performed by: ANESTHESIOLOGY

## 2025-05-06 PROCEDURE — 1036F TOBACCO NON-USER: CPT | Performed by: ANESTHESIOLOGY

## 2025-05-06 PROCEDURE — 99214 OFFICE O/P EST MOD 30 MIN: CPT | Performed by: ANESTHESIOLOGY

## 2025-05-06 PROCEDURE — 1125F AMNT PAIN NOTED PAIN PRSNT: CPT | Performed by: ANESTHESIOLOGY

## 2025-05-06 RX ORDER — DIPHENHYDRAMINE HYDROCHLORIDE 50 MG/ML
50 INJECTION, SOLUTION INTRAMUSCULAR; INTRAVENOUS AS NEEDED
OUTPATIENT
Start: 2025-05-06

## 2025-05-06 RX ORDER — FAMOTIDINE 10 MG/ML
20 INJECTION, SOLUTION INTRAVENOUS ONCE AS NEEDED
OUTPATIENT
Start: 2025-05-06

## 2025-05-06 RX ORDER — KETOROLAC TROMETHAMINE 30 MG/ML
30 INJECTION, SOLUTION INTRAMUSCULAR; INTRAVENOUS ONCE
OUTPATIENT
Start: 2025-05-06 | End: 2025-05-06

## 2025-05-06 RX ORDER — EPINEPHRINE 1 MG/ML
0.3 INJECTION, SOLUTION, CONCENTRATE INTRAVENOUS EVERY 5 MIN PRN
OUTPATIENT
Start: 2025-05-06

## 2025-05-06 RX ORDER — ALBUTEROL SULFATE 0.83 MG/ML
3 SOLUTION RESPIRATORY (INHALATION) AS NEEDED
OUTPATIENT
Start: 2025-05-06

## 2025-05-06 ASSESSMENT — ENCOUNTER SYMPTOMS
ARTHRALGIAS: 1
GASTROINTESTINAL NEGATIVE: 1
OCCASIONAL FEELINGS OF UNSTEADINESS: 1
MYALGIAS: 1
CARDIOVASCULAR NEGATIVE: 1
RESPIRATORY NEGATIVE: 1
PSYCHIATRIC NEGATIVE: 1
HEMATOLOGIC/LYMPHATIC NEGATIVE: 1
ENDOCRINE NEGATIVE: 1
DEPRESSION: 0
EYES NEGATIVE: 1
LOSS OF SENSATION IN FEET: 0
BACK PAIN: 1

## 2025-05-06 ASSESSMENT — COLUMBIA-SUICIDE SEVERITY RATING SCALE - C-SSRS
6. HAVE YOU EVER DONE ANYTHING, STARTED TO DO ANYTHING, OR PREPARED TO DO ANYTHING TO END YOUR LIFE?: NO
2. HAVE YOU ACTUALLY HAD ANY THOUGHTS OF KILLING YOURSELF?: NO
1. IN THE PAST MONTH, HAVE YOU WISHED YOU WERE DEAD OR WISHED YOU COULD GO TO SLEEP AND NOT WAKE UP?: NO

## 2025-05-06 ASSESSMENT — PATIENT HEALTH QUESTIONNAIRE - PHQ9
2. FEELING DOWN, DEPRESSED OR HOPELESS: NOT AT ALL
SUM OF ALL RESPONSES TO PHQ9 QUESTIONS 1 AND 2: 0
1. LITTLE INTEREST OR PLEASURE IN DOING THINGS: NOT AT ALL

## 2025-05-06 ASSESSMENT — PAIN DESCRIPTION - DESCRIPTORS: DESCRIPTORS: SPASM;ACHING;SHARP

## 2025-05-06 ASSESSMENT — PAIN SCALES - GENERAL
PAINLEVEL_OUTOF10: 9
PAINLEVEL_OUTOF10: 9

## 2025-05-06 ASSESSMENT — PAIN - FUNCTIONAL ASSESSMENT: PAIN_FUNCTIONAL_ASSESSMENT: 0-10

## 2025-05-06 NOTE — PROGRESS NOTES
This is 69 y.o.  female with who has been treated for Lower back pain. Pain is worse, The pain is described as achiness and is relieved by Standing, Sitting, Lying down, and Walking with who is here for follow-up   Chief Complaint   Patient presents with    Follow-up     Low back down buttock to right leg. Bilateral shoulders and arms       Pain Therapies: Injections

## 2025-05-12 ENCOUNTER — APPOINTMENT (OUTPATIENT)
Dept: RHEUMATOLOGY | Facility: CLINIC | Age: 70
End: 2025-05-12
Payer: COMMERCIAL

## 2025-05-12 VITALS — SYSTOLIC BLOOD PRESSURE: 136 MMHG | DIASTOLIC BLOOD PRESSURE: 80 MMHG | OXYGEN SATURATION: 94 % | HEART RATE: 92 BPM

## 2025-05-12 DIAGNOSIS — M06.09 SERONEGATIVE ARTHROPATHY OF MULTIPLE SITES (MULTI): ICD-10-CM

## 2025-05-12 DIAGNOSIS — M1A.9XX0 CHRONIC GOUT WITHOUT TOPHUS, UNSPECIFIED CAUSE, UNSPECIFIED SITE: Primary | ICD-10-CM

## 2025-05-12 PROCEDURE — 3075F SYST BP GE 130 - 139MM HG: CPT | Performed by: INTERNAL MEDICINE

## 2025-05-12 PROCEDURE — 1160F RVW MEDS BY RX/DR IN RCRD: CPT | Performed by: INTERNAL MEDICINE

## 2025-05-12 PROCEDURE — 1159F MED LIST DOCD IN RCRD: CPT | Performed by: INTERNAL MEDICINE

## 2025-05-12 PROCEDURE — 99214 OFFICE O/P EST MOD 30 MIN: CPT | Performed by: INTERNAL MEDICINE

## 2025-05-12 PROCEDURE — 3079F DIAST BP 80-89 MM HG: CPT | Performed by: INTERNAL MEDICINE

## 2025-05-12 PROCEDURE — 1036F TOBACCO NON-USER: CPT | Performed by: INTERNAL MEDICINE

## 2025-05-12 RX ORDER — HYDROXYCHLOROQUINE SULFATE 200 MG/1
200 TABLET, FILM COATED ORAL 2 TIMES DAILY
Qty: 180 TABLET | Refills: 1 | Status: SHIPPED | OUTPATIENT
Start: 2025-05-12

## 2025-05-12 RX ORDER — ALLOPURINOL 300 MG/1
300 TABLET ORAL DAILY
Qty: 90 TABLET | Refills: 0 | Status: SHIPPED | OUTPATIENT
Start: 2025-05-12

## 2025-05-12 RX ORDER — PREDNISONE 5 MG/1
TABLET ORAL
Qty: 28 TABLET | Refills: 0 | Status: SHIPPED | OUTPATIENT
Start: 2025-05-12 | End: 2025-05-24

## 2025-05-12 ASSESSMENT — ENCOUNTER SYMPTOMS
LOSS OF SENSATION IN FEET: 0
DEPRESSION: 0
OCCASIONAL FEELINGS OF UNSTEADINESS: 0

## 2025-05-12 NOTE — PROGRESS NOTES
Follow-up Rheumatology Patient Visit    Chief Complaint:  Marcella Muñoz is a 69 y.o. female presenting today for Follow-up.    History of Presenting Problem:   68 y/o female with Gout and seronegative Arthropathy present for f/u.  She is currently on PLQ  200 mg twice a day and allopurinol 300 mg daily.    She continues to deal with chronic knee pain and sciatica. She is having issues with mobility due to her knee. She has been in pain management, steroid injection did not help.  She is on Celebrex 200 mg BID  Today patient is complaining of worsening joint pain mostly with her lower back and sciatica issues and also reporting pain in her upper back and left shoulder.  Patient did not find Cymbalta that helped her chronic pain        Problem List:   Patient Active Problem List   Diagnosis    Alkaline phosphatase elevation    Arthropathy    Carpal tunnel syndrome of left wrist    Carpal tunnel syndrome of right wrist    Chronic pain of both knees    Decreased hearing of both ears    Diverticulosis of colon    DJD (degenerative joint disease) of knee    Endogenous hyperlipidemia    Fall    Gout    Hyperuricemia    Hypertension, benign    Knee pain    Left rotator cuff tear arthropathy    Left shoulder pain    Low back pain    Low serum vitamin D    NAFLD (nonalcoholic fatty liver disease)    Pain in both upper extremities    Polyarthritis, inflammatory (Multi)    Seronegative arthropathy of multiple sites (Multi)    Suspected sleep apnea    Vitamin D deficiency    Class 3 severe obesity due to excess calories with serious comorbidity and body mass index (BMI) of 60.0 to 69.9 in adult    Morbid obesity with BMI of 60.0-69.9, adult (Multi)    Fibromyalgia       Past Medical History:   Past Medical History:   Diagnosis Date    Personal history of other diseases of the circulatory system     History of hypertension    Personal history of pneumonia (recurrent)     History of pneumonia       Surgical History:   Past  Surgical History:   Procedure Laterality Date    OTHER SURGICAL HISTORY  08/07/2019    Hernia repair    OTHER SURGICAL HISTORY  08/07/2019    Vaginal sling procedure    OTHER SURGICAL HISTORY  08/29/2019    Carpal tunnel surgery    OTHER SURGICAL HISTORY  06/09/2019    Colonoscopy    OTHER SURGICAL HISTORY  06/09/2019    Umbilical hernia repair    OTHER SURGICAL HISTORY  06/09/2019    Tubal ligation bilateral    OTHER SURGICAL HISTORY  06/09/2019    Partial cystectomy    OTHER SURGICAL HISTORY  06/09/2019    Laparoscopic right hemicolectomy    OTHER SURGICAL HISTORY  06/09/2019    Urethropexy        Allergies: No Known Allergies    Medications:   Current Outpatient Medications:     amLODIPine (Norvasc) 5 mg tablet, Take 1 tablet (5 mg) by mouth once daily., Disp: 90 tablet, Rfl: 1    celecoxib (CeleBREX) 200 mg capsule, TAKE 1 CAPSULE TWICE A DAY AS NEEDED, Disp: 180 capsule, Rfl: 3    cholecalciferol (Vitamin D-3) 25 MCG (1000 UT) tablet, Take 1 tablet (25 mcg) by mouth once daily., Disp: , Rfl:     KRILL OIL ORAL, Take by mouth., Disp: , Rfl:     telmisartan-hydrochlorothiazid (MIcarDIS HCT) 80-25 mg tablet, Take 1 tablet by mouth once daily., Disp: 90 tablet, Rfl: 1    vitamin E acetate (VITAMIN E ORAL), Take 1 tablet by mouth 1 (one) time each day., Disp: , Rfl:     allopurinol (Zyloprim) 300 mg tablet, Take 1 tablet (300 mg) by mouth once daily., Disp: 90 tablet, Rfl: 0    hydroxychloroquine (Plaquenil) 200 mg tablet, Take 1 tablet (200 mg) by mouth 2 times a day., Disp: 180 tablet, Rfl: 1    mirabegron (Myrbetriq) 50 mg tablet extended release 24 hr 24 hr tablet, , Disp: , Rfl:     omega-3/dha/epa/fish oil (OMEGA-3 ORAL), Take by mouth. Take as directed (Patient not taking: Reported on 5/12/2025), Disp: , Rfl:     oxybutynin XL (Ditropan-XL) 15 mg 24 hr tablet, , Disp: , Rfl:     predniSONE (Deltasone) 5 mg tablet, Take 4 tablets (20 mg) by mouth once daily for 4 days, THEN 2 tablets (10 mg) once daily for 4  days, THEN 1 tablet (5 mg) once daily for 4 days., Disp: 28 tablet, Rfl: 0    Current Facility-Administered Medications:     triamcinolone acetonide (Zilretta) injection 32 mg, 32 mg, intra-articular, Once, Shannan Brown MD      Objective   Physical Examination:    Visit Vitals  /80 (BP Location: Left arm, Patient Position: Sitting)   Pulse 92   SpO2 94%   OB Status Menopausal   Smoking Status Never        Gen: NAD, A&O x 3  HEENT: clear sclera and conjunctiva,     Musculoskeletal:   Neck; WNL, full ROM  Shoulder: WNL, full ROM  Elbow:WNL, full ROM, no effusion noted  Wrist and fingers;no active synovitis noted, Full ROM in the Wrist , Good fist and   Knees:  No effusions or crepitation, full ROM.  Hips; WNL, full ROM, Negative Maycol test  Ankle, Feet; WNL, full ROM    Skin: No rashes or lesions seen, no nail changes  Neuro: A&O x3, Normal Gait    Procedures :None    Orders:  Orders Placed This Encounter   Procedures    Uric Acid    Sedimentation Rate    C-Reactive Protein        Provider Impression:   Assessment/Plan   Encounter Diagnoses   Name Primary?    Chronic gout without tophus, unspecified cause, unspecified site Yes    Seronegative arthropathy of multiple sites (Multi)          69-year-old female with Gout and seronegative arthropathy. She does have chronic lower back pain and chronic knee pain. X-ray in the past show severe knee degenerative arthritis worse in the medial area  -Start prednisone Taper, starting with 20 mg and weaning off in the next 12 days  -Continue allopurinol 300 mg daily as tolerated   -Continue with Celebrex 200 mg once daily, Use Tylenol as needed  -Continue Plaquenil 200 mg twice a day patient is up-to-date with eye exams   -Continue with pain management for lower back pain and sciatica  -Follow-up in 6 months

## 2025-05-13 LAB
CRP SERPL-MCNC: 5 MG/L
ERYTHROCYTE [SEDIMENTATION RATE] IN BLOOD BY WESTERGREN METHOD: 34 MM/H
URATE SERPL-MCNC: 4.7 MG/DL (ref 2.5–7)

## 2025-05-19 ENCOUNTER — HOSPITAL ENCOUNTER (OUTPATIENT)
Dept: PAIN MEDICINE | Facility: CLINIC | Age: 70
Discharge: HOME | End: 2025-05-19
Payer: COMMERCIAL

## 2025-05-19 VITALS
DIASTOLIC BLOOD PRESSURE: 70 MMHG | OXYGEN SATURATION: 96 % | SYSTOLIC BLOOD PRESSURE: 141 MMHG | RESPIRATION RATE: 16 BRPM | TEMPERATURE: 97.5 F | WEIGHT: 293 LBS | BODY MASS INDEX: 68.06 KG/M2 | HEART RATE: 81 BPM

## 2025-05-19 DIAGNOSIS — M46.1 SACROILIITIS: ICD-10-CM

## 2025-05-19 DIAGNOSIS — M79.7 FIBROMYALGIA: ICD-10-CM

## 2025-05-19 PROCEDURE — 2550000001 HC RX 255 CONTRASTS: Performed by: ANESTHESIOLOGY

## 2025-05-19 PROCEDURE — 27096 INJECT SACROILIAC JOINT: CPT | Performed by: ANESTHESIOLOGY

## 2025-05-19 PROCEDURE — 2500000004 HC RX 250 GENERAL PHARMACY W/ HCPCS (ALT 636 FOR OP/ED): Mod: JZ | Performed by: ANESTHESIOLOGY

## 2025-05-19 RX ORDER — LIDOCAINE HYDROCHLORIDE 5 MG/ML
INJECTION, SOLUTION INFILTRATION; INTRAVENOUS AS NEEDED
Status: COMPLETED | OUTPATIENT
Start: 2025-05-19 | End: 2025-05-19

## 2025-05-19 RX ORDER — TRIAMCINOLONE ACETONIDE 40 MG/ML
INJECTION, SUSPENSION INTRA-ARTICULAR; INTRAMUSCULAR AS NEEDED
Status: COMPLETED | OUTPATIENT
Start: 2025-05-19 | End: 2025-05-19

## 2025-05-19 RX ORDER — BUPIVACAINE HYDROCHLORIDE 7.5 MG/ML
INJECTION, SOLUTION EPIDURAL; RETROBULBAR AS NEEDED
Status: COMPLETED | OUTPATIENT
Start: 2025-05-19 | End: 2025-05-19

## 2025-05-19 RX ADMIN — LIDOCAINE HYDROCHLORIDE 10 ML: 5 INJECTION, SOLUTION INFILTRATION at 14:11

## 2025-05-19 RX ADMIN — BUPIVACAINE HYDROCHLORIDE 10 ML: 7.5 INJECTION, SOLUTION EPIDURAL; RETROBULBAR at 14:11

## 2025-05-19 RX ADMIN — IOHEXOL 3 ML: 300 INJECTION, SOLUTION INTRAVENOUS at 14:12

## 2025-05-19 RX ADMIN — TRIAMCINOLONE ACETONIDE 40 MG: 40 INJECTION, SUSPENSION INTRA-ARTICULAR; INTRAMUSCULAR at 14:12

## 2025-05-19 ASSESSMENT — PAIN SCALES - GENERAL
PAINLEVEL_OUTOF10: 4
PAINLEVEL_OUTOF10: 9

## 2025-05-19 ASSESSMENT — PAIN - FUNCTIONAL ASSESSMENT: PAIN_FUNCTIONAL_ASSESSMENT: 0-10

## 2025-06-12 DIAGNOSIS — M79.7 FIBROMYALGIA: Primary | ICD-10-CM

## 2025-06-12 RX ORDER — EPINEPHRINE 1 MG/ML
0.3 INJECTION, SOLUTION, CONCENTRATE INTRAVENOUS EVERY 5 MIN PRN
OUTPATIENT
Start: 2025-06-17

## 2025-06-12 RX ORDER — FAMOTIDINE 10 MG/ML
20 INJECTION, SOLUTION INTRAVENOUS ONCE AS NEEDED
OUTPATIENT
Start: 2025-06-17

## 2025-06-12 RX ORDER — KETOROLAC TROMETHAMINE 30 MG/ML
30 INJECTION, SOLUTION INTRAMUSCULAR; INTRAVENOUS ONCE
OUTPATIENT
Start: 2025-06-17 | End: 2025-06-17

## 2025-06-12 RX ORDER — DIPHENHYDRAMINE HYDROCHLORIDE 50 MG/ML
50 INJECTION, SOLUTION INTRAMUSCULAR; INTRAVENOUS AS NEEDED
OUTPATIENT
Start: 2025-06-17

## 2025-06-12 RX ORDER — ALBUTEROL SULFATE 0.83 MG/ML
3 SOLUTION RESPIRATORY (INHALATION) AS NEEDED
OUTPATIENT
Start: 2025-06-17

## 2025-06-17 ENCOUNTER — INFUSION (OUTPATIENT)
Dept: INFUSION THERAPY | Facility: CLINIC | Age: 70
End: 2025-06-17
Payer: COMMERCIAL

## 2025-06-17 VITALS
DIASTOLIC BLOOD PRESSURE: 64 MMHG | SYSTOLIC BLOOD PRESSURE: 132 MMHG | HEART RATE: 69 BPM | OXYGEN SATURATION: 95 % | RESPIRATION RATE: 18 BRPM | TEMPERATURE: 98.2 F

## 2025-06-17 DIAGNOSIS — M79.7 FIBROMYALGIA: ICD-10-CM

## 2025-06-17 PROCEDURE — 96375 TX/PRO/DX INJ NEW DRUG ADDON: CPT | Mod: INF

## 2025-06-17 PROCEDURE — 96368 THER/DIAG CONCURRENT INF: CPT | Mod: INF

## 2025-06-17 PROCEDURE — 96365 THER/PROPH/DIAG IV INF INIT: CPT | Mod: INF

## 2025-06-17 PROCEDURE — 2500000004 HC RX 250 GENERAL PHARMACY W/ HCPCS (ALT 636 FOR OP/ED): Mod: JZ | Performed by: NURSE PRACTITIONER

## 2025-06-17 RX ORDER — ALBUTEROL SULFATE 0.83 MG/ML
3 SOLUTION RESPIRATORY (INHALATION) AS NEEDED
OUTPATIENT
Start: 2025-07-01

## 2025-06-17 RX ORDER — EPINEPHRINE 1 MG/ML
0.3 INJECTION, SOLUTION, CONCENTRATE INTRAVENOUS EVERY 5 MIN PRN
OUTPATIENT
Start: 2025-07-01

## 2025-06-17 RX ORDER — DIPHENHYDRAMINE HYDROCHLORIDE 50 MG/ML
50 INJECTION, SOLUTION INTRAMUSCULAR; INTRAVENOUS AS NEEDED
OUTPATIENT
Start: 2025-07-01

## 2025-06-17 RX ORDER — KETOROLAC TROMETHAMINE 30 MG/ML
30 INJECTION, SOLUTION INTRAMUSCULAR; INTRAVENOUS ONCE
OUTPATIENT
Start: 2025-07-01 | End: 2025-07-01

## 2025-06-17 RX ORDER — KETOROLAC TROMETHAMINE 30 MG/ML
30 INJECTION, SOLUTION INTRAMUSCULAR; INTRAVENOUS ONCE
Status: COMPLETED | OUTPATIENT
Start: 2025-06-17 | End: 2025-06-17

## 2025-06-17 RX ORDER — FAMOTIDINE 10 MG/ML
20 INJECTION, SOLUTION INTRAVENOUS ONCE AS NEEDED
OUTPATIENT
Start: 2025-07-01

## 2025-06-17 RX ADMIN — Medication: at 13:21

## 2025-06-17 RX ADMIN — PROPOFOL 100 MG: 10 INJECTION, EMULSION INTRAVENOUS at 13:21

## 2025-06-17 RX ADMIN — KETOROLAC TROMETHAMINE 30 MG: 30 INJECTION, SOLUTION INTRAMUSCULAR at 13:21

## 2025-06-17 ASSESSMENT — ENCOUNTER SYMPTOMS
LOSS OF SENSATION IN FEET: 0
DEPRESSION: 0
OCCASIONAL FEELINGS OF UNSTEADINESS: 1

## 2025-06-17 ASSESSMENT — PAIN SCALES - GENERAL
PAINLEVEL_OUTOF10: 9
PAINLEVEL_OUTOF10: 9
PAINLEVEL_OUTOF10: 7

## 2025-06-17 NOTE — PATIENT INSTRUCTIONS
Today :We administered (ketamine 30 mg, lidocaine (Xylocaine) 300 mg in sodium chloride 0.9% 518 mL IV), propofol (Diprivan) 100 mg in dextrose 5% 25 mL IV, and ketorolac.     For:   1. Fibromyalgia       (Tell all doctors including dentists that you are taking this medication)     Go to the emergency room or call 911 if:  -You have signs of allergic reaction:   -Rash, hives, itching.   -Swollen, blistered, peeling skin.   -Swelling of face, lips, mouth, tongue or throat.   -Tightness of chest, trouble breathing, swallowing or talking     Call your doctor:  - If IV / injection site gets red, warm, swollen, itchy or leaks fluid or pus.     (Leave dressing on your IV site for at least 2 hours and keep area clean and dry  - If you get sick or have symptoms of infection or are not feeling well for any reason.    (Wash your hands often, stay away from people who are sick)  - If you have side effects from your medication that do not go away or are bothersome.     (Refer to the teaching your nurse gave you for side effects to call your doctor about)    - Common side effects may include:  stuffy nose, headache, feeling tired, muscle aches, upset stomach  - Before receiving any vaccines     - [facility] at   If:  - You get sick, are on antibiotics, have had a recent vaccine, have surgery or dental work and your doctor wants your visit rescheduled.  - You need to cancel and reschedule your visit for any reason. Call at least 2 days before your visit if you need to cancel.   - Your insurance changes before your next visit.    (We will need to get approval from your new insurance. This can take up to two weeks.)     [facility] is opened Monday thru Friday. We are closed on weekends and holidays.   Voice mail will take your call if the center is closed. If you leave a message please allow 24 hours for a call back during weekdays. If you leave a message on a weekend/holiday, we will call you  back the next business day.    A pharmacist is available Monday - Friday from 8:30AM to 3:30PM to help answer any questions you may have about your prescriptions(s). Please call pharmacy at:    [facility]: [phone]  [facility]: [phone]  [facility]: [phone]              [facility]      Pain Infusion Aftercare Instructions      1. It is normal to feel sedated, tired and low in energy after a pain infusion. DO NOT DRIVE, OPERATE ANY MACHINERY, OR MAKE ANY IMPORTANT DECISIONS FOR AT LEAST 24 HOURS AFTER THE INFUSION.     2. Call the pain center at [phone] with any problems, questions, or concerns.     3. Eat light after the infusion. If you feel queasy or sick to your stomach, laying down with your eyes closed may help. When you resume eating start with something mild like clear liquids, yogurt, applesauce, crackers, etc… Gradually advance to a regular diet.     4. Do not leave your house alone the evening of your pain infusion.     5. No alcohol or sedative medications, such as sleeping pills, for 24 hours after your pain infusion.     6. Resume all other prescribed medications unless directed otherwise by you physician.     7. If you have any medical emergencies, call 911 or go directly to the closest emergency room.

## 2025-06-17 NOTE — PROGRESS NOTES
S: Patient here for 1st opioid sparing pain infusion.   Purpose of pain infusion meds explained along with potential side effects.  Patient verbalized understanding.    B: Pain Issues. arms, back, left knee, right butt     Is Patient breast feeding: n/a      Is Patient receiving any other form Ketamine from any other facility/ outside clinic ?: no  A: Patient currently has pain described on flow sheet documentation. Designated  is [email or web address] [phone]. Patient last ate solid food >12 hours ago, and had liquid 1 hours ago.    R: Plan; Obtain IV access, do patient risk assessment, and start opioid sparing infusion as ordered. Monitoring for S/S of adverse reactions.

## 2025-06-17 NOTE — PROGRESS NOTES
Post infusion teaching provided. Patient verbalized understanding. VSS, Patient states pain is 7/10 same locations.   Patient tolerated pain infusion well without difficulty.

## 2025-07-01 ENCOUNTER — INFUSION (OUTPATIENT)
Dept: INFUSION THERAPY | Facility: CLINIC | Age: 70
End: 2025-07-01
Payer: COMMERCIAL

## 2025-07-01 VITALS
DIASTOLIC BLOOD PRESSURE: 59 MMHG | TEMPERATURE: 97 F | SYSTOLIC BLOOD PRESSURE: 119 MMHG | OXYGEN SATURATION: 96 % | RESPIRATION RATE: 18 BRPM | HEART RATE: 66 BPM

## 2025-07-01 DIAGNOSIS — M79.7 FIBROMYALGIA: ICD-10-CM

## 2025-07-01 PROCEDURE — 2500000004 HC RX 250 GENERAL PHARMACY W/ HCPCS (ALT 636 FOR OP/ED): Performed by: NURSE PRACTITIONER

## 2025-07-01 PROCEDURE — 96365 THER/PROPH/DIAG IV INF INIT: CPT | Mod: INF

## 2025-07-01 PROCEDURE — 96375 TX/PRO/DX INJ NEW DRUG ADDON: CPT | Mod: INF

## 2025-07-01 PROCEDURE — 96368 THER/DIAG CONCURRENT INF: CPT | Mod: INF

## 2025-07-01 RX ORDER — KETOROLAC TROMETHAMINE 30 MG/ML
30 INJECTION, SOLUTION INTRAMUSCULAR; INTRAVENOUS ONCE
Status: COMPLETED | OUTPATIENT
Start: 2025-07-01 | End: 2025-07-01

## 2025-07-01 RX ORDER — FAMOTIDINE 10 MG/ML
20 INJECTION, SOLUTION INTRAVENOUS ONCE AS NEEDED
OUTPATIENT
Start: 2025-07-15

## 2025-07-01 RX ORDER — EPINEPHRINE 1 MG/ML
0.3 INJECTION, SOLUTION, CONCENTRATE INTRAVENOUS EVERY 5 MIN PRN
OUTPATIENT
Start: 2025-07-15

## 2025-07-01 RX ORDER — ALBUTEROL SULFATE 0.83 MG/ML
3 SOLUTION RESPIRATORY (INHALATION) AS NEEDED
OUTPATIENT
Start: 2025-07-15

## 2025-07-01 RX ORDER — KETOROLAC TROMETHAMINE 30 MG/ML
30 INJECTION, SOLUTION INTRAMUSCULAR; INTRAVENOUS ONCE
Status: CANCELLED | OUTPATIENT
Start: 2025-07-15 | End: 2025-07-15

## 2025-07-01 RX ORDER — DIPHENHYDRAMINE HYDROCHLORIDE 50 MG/ML
50 INJECTION, SOLUTION INTRAMUSCULAR; INTRAVENOUS AS NEEDED
OUTPATIENT
Start: 2025-07-15

## 2025-07-01 RX ADMIN — Medication: at 14:10

## 2025-07-01 RX ADMIN — PROPOFOL 100 MG: 10 INJECTION, EMULSION INTRAVENOUS at 14:11

## 2025-07-01 RX ADMIN — KETOROLAC TROMETHAMINE 30 MG: 30 INJECTION, SOLUTION INTRAMUSCULAR at 14:10

## 2025-07-01 ASSESSMENT — ENCOUNTER SYMPTOMS
OCCASIONAL FEELINGS OF UNSTEADINESS: 1
LOSS OF SENSATION IN FEET: 0
DEPRESSION: 0

## 2025-07-01 ASSESSMENT — PAIN - FUNCTIONAL ASSESSMENT
PAIN_FUNCTIONAL_ASSESSMENT: 0-10
PAIN_FUNCTIONAL_ASSESSMENT: 0-10

## 2025-07-01 ASSESSMENT — PAIN SCALES - GENERAL
PAINLEVEL_OUTOF10: 5 - MODERATE PAIN
PAINLEVEL_OUTOF10: 8

## 2025-07-01 ASSESSMENT — PAIN DESCRIPTION - DESCRIPTORS: DESCRIPTORS: SHARP

## 2025-07-01 NOTE — PATIENT INSTRUCTIONS
Today :We administered (ketamine 30 mg, lidocaine (Xylocaine) 300 mg in sodium chloride 0.9% 518 mL IV), propofol (Diprivan) 100 mg in dextrose 5% 25 mL IV, and ketorolac.     For:   1. Fibromyalgia         Your next appointment is due in:  2 weeks        Please read the  Medication Guide that was given to you and reviewed during todays visit.     (Tell all doctors including dentists that you are taking this medication)     Go to the emergency room or call 911 if:  -You have signs of allergic reaction:   -Rash, hives, itching.   -Swollen, blistered, peeling skin.   -Swelling of face, lips, mouth, tongue or throat.   -Tightness of chest, trouble breathing, swallowing or talking     Call your doctor:  - If IV / injection site gets red, warm, swollen, itchy or leaks fluid or pus.     (Leave dressing on your IV site for at least 2 hours and keep area clean and dry  - If you get sick or have symptoms of infection or are not feeling well for any reason.    (Wash your hands often, stay away from people who are sick)  - If you have side effects from your medication that do not go away or are bothersome.     (Refer to the teaching your nurse gave you for side effects to call your doctor about)    - Common side effects may include:  stuffy nose, headache, feeling tired, muscle aches, upset stomach  - Before receiving any vaccines     - Call [facility] at   If:  - You get sick, are on antibiotics, have had a recent vaccine, have surgery or dental work and your doctor wants your visit rescheduled.  - You need to cancel and reschedule your visit for any reason. Call at least 2 days before your visit if you need to cancel.   - Your insurance changes before your next visit.    (We will need to get approval from your new insurance. This can take up to two weeks.)     [facility] is opened Monday thru Friday. We are closed on weekends and holidays.   Voice mail will take your call if the  center is closed. If you leave a message please allow 24 hours for a call back during weekdays. If you leave a message on a weekend/holiday, we will call you back the next business day.    A pharmacist is available Monday - Friday from 8:30AM to 3:30PM to help answer any questions you may have about your prescriptions(s). Please call pharmacy at:    [facility]: [phone]  [facility]: [phone]  [facility]: [phone]              [facility]      Pain Infusion Aftercare Instructions      1. It is normal to feel sedated, tired and low in energy after a pain infusion. DO NOT DRIVE, OPERATE ANY MACHINERY, OR MAKE ANY IMPORTANT DECISIONS FOR AT LEAST 24 HOURS AFTER THE INFUSION.     2. Call the pain center at [phone] with any problems, questions, or concerns.     3. Eat light after the infusion. If you feel queasy or sick to your stomach, laying down with your eyes closed may help. When you resume eating start with something mild like clear liquids, yogurt, applesauce, crackers, etc… Gradually advance to a regular diet.     4. Do not leave your house alone the evening of your pain infusion.     5. No alcohol or sedative medications, such as sleeping pills, for 24 hours after your pain infusion.     6. Resume all other prescribed medications unless directed otherwise by you physician.     7. If you have any medical emergencies, call 911 or go directly to the closest emergency room.    Patient Instructions  IV Infusion   [facility]      1. A responsible adult must stay with you during your infusion and drive you home. If you do not have a responsible adult with transportation home, your appointment will be rescheduled. Patients must still have a responsible adult if they use [employer]ides[employer], Uber, or Lyft. Uber, [employer]ides[employer], or Lyft drivers do not qualify.   2. On the day of your infusion we ask that you please drink clear liquids until  your appointment.  You should NOT eat food 4 hours before your infusion.    3. Take all medications as prescribed before your infusion. Do not withhold blood pressure medication.   4. Patients who use a CPAP or BIPAP should bring it to the infusion appointment.   5. Children under 16 will not be permitted in the infusion clinic. Please do not bring young children or pets. For patients who must bring a service animal, paperwork will be required. NO EXCEPTIONS.  6.  All Women will be required to take a pregnancy test prior to the infusion unless they are above 55 years of age or have had a hysterectomy.   7. Patients who cancel their infusion should call the Infusion line at [phone] as soon as possible. We would appreciate a 48-hour notice. Please be on time for the appt. Patients who are more than 15 mins late will have to cancel and reschedule. Infusion appt times are minimal. Patients who do not show, cancel, or reschedule an appointment may have a long wait until we can get them back on the schedule.   8. We make every effort to schedule your infusions based on your physician's recommendations. However, factors will affect our infusion schedule and availability. We appreciate your understanding.  9. Appointments will only be scheduled for two appointments in the future.   10. No eating, drinking, or chewing gum during the infusion.   11. If you miss or cancel your infusion appointment it is YOUR responsibility to call us and reschedule.  Please call [phone] or [phone] to reschedule or cancel appointment

## 2025-07-01 NOTE — PROGRESS NOTES
S: Patient here for 2nd opioid sparing pain infusion. Patient reports no reduction in pain after last infusion.    Purpose of pain infusion meds explained along with potential side effects.  Patient verbalized understanding.    B: Pain Issues are 8/10 generalized pain. Is Patient breast feeding: no    Is Patient receiving any other form Ketamine from any other facility/ outside clinic ?: no    A: Patient currently has pain described on flow sheet documentation. Designated  is [employer] @[phone]. Patient last ate solid food 21 hours ago, and had liquid 1 hour ago.    R: Plan; Obtain IV access, do patient risk assessment, and start opioid sparing infusion as ordered. Monitoring for S/S of adverse reactions.    Post infusion teaching provided. Patient verbalized understanding. VSS, Patient states pain is 5/10. Will assist patient to waiting car via wheelchair.

## 2025-07-03 ENCOUNTER — OFFICE VISIT (OUTPATIENT)
Dept: PAIN MEDICINE | Facility: CLINIC | Age: 70
End: 2025-07-03
Payer: COMMERCIAL

## 2025-07-03 VITALS
HEART RATE: 81 BPM | SYSTOLIC BLOOD PRESSURE: 127 MMHG | RESPIRATION RATE: 16 BRPM | DIASTOLIC BLOOD PRESSURE: 63 MMHG | TEMPERATURE: 99 F | OXYGEN SATURATION: 92 %

## 2025-07-03 DIAGNOSIS — M54.16 LUMBAR RADICULOPATHY, RIGHT: Primary | ICD-10-CM

## 2025-07-03 DIAGNOSIS — E66.01 MORBID OBESITY WITH BMI OF 60.0-69.9, ADULT (MULTI): ICD-10-CM

## 2025-07-03 DIAGNOSIS — M43.06 LUMBAR SPONDYLOLYSIS: ICD-10-CM

## 2025-07-03 DIAGNOSIS — M79.7 FIBROMYALGIA: ICD-10-CM

## 2025-07-03 PROCEDURE — 1159F MED LIST DOCD IN RCRD: CPT | Performed by: NURSE PRACTITIONER

## 2025-07-03 PROCEDURE — 3074F SYST BP LT 130 MM HG: CPT | Performed by: NURSE PRACTITIONER

## 2025-07-03 PROCEDURE — 3078F DIAST BP <80 MM HG: CPT | Performed by: NURSE PRACTITIONER

## 2025-07-03 PROCEDURE — 1125F AMNT PAIN NOTED PAIN PRSNT: CPT | Performed by: NURSE PRACTITIONER

## 2025-07-03 PROCEDURE — 99215 OFFICE O/P EST HI 40 MIN: CPT | Performed by: NURSE PRACTITIONER

## 2025-07-03 RX ORDER — KETOROLAC TROMETHAMINE 30 MG/ML
30 INJECTION, SOLUTION INTRAMUSCULAR; INTRAVENOUS ONCE
OUTPATIENT
Start: 2025-07-18 | End: 2025-07-18

## 2025-07-03 ASSESSMENT — PAIN SCALES - GENERAL
PAINLEVEL_OUTOF10: 7
PAINLEVEL_OUTOF10: 7

## 2025-07-03 ASSESSMENT — ENCOUNTER SYMPTOMS
SHORTNESS OF BREATH: 1
FREQUENCY: 1
ARTHRALGIAS: 1
DIZZINESS: 0
CONFUSION: 0
AGITATION: 0
CHILLS: 0
NAUSEA: 0
DEPRESSION: 0
LOSS OF SENSATION IN FEET: 0
WHEEZING: 0
NUMBNESS: 1
OCCASIONAL FEELINGS OF UNSTEADINESS: 1
FATIGUE: 0
MYALGIAS: 1
CHEST TIGHTNESS: 0
PALPITATIONS: 0
DIARRHEA: 0
CONSTIPATION: 0
BACK PAIN: 1

## 2025-07-03 ASSESSMENT — PAIN - FUNCTIONAL ASSESSMENT: PAIN_FUNCTIONAL_ASSESSMENT: 0-10

## 2025-07-03 ASSESSMENT — PAIN DESCRIPTION - DESCRIPTORS: DESCRIPTORS: ACHING;SHARP

## 2025-07-03 NOTE — PROGRESS NOTES
Chief Complain  Follow-up for right sided lower back pain and generalized body pain    History Of Present Illness  Marcella Muñoz is a 69 y.o. female here for lower back and right buttocks pain and generalized body pain. The patient rates the pain at 7 on a scale from 0-10.  The patient describes pain as sharp, aching.  The pain is worsened by standing, walking, and kneeling and is alleviated by medications nonsteroidal anti-inflammatory drugs and Tylenol, sitting, and lying down.  Since the last visit the pain has worsened.    The patient denies any fever, chills, weight loss, weakness, bladder/ bowel incontinence, history of cancer, history of IV drug abuse, recent trauma.     Prior office visit:  5/06/2025 Dr. Brown  This is 69 y.o.  female with PMH of morbid obesity BMI 68, arthritis worse in the knee, hypertension, seronegative arthropathy with multiple sites started on the methylprednisolone by Dr. Horn and on Plaquenil and Celebrex and get Synvisc injection and SI injection from us last injection was February 2024 with good benefit who is here for follow-up stating that her pain is getting worse specially when her right SI joint.  The patient has also a lot of pain all over her body.  She does not feel the Celebrex is helping much.  I do not use the Celebrex 2 of them together and just to see how much relief she gets and how long the relief lasts usually that would be enough for her for 2 or 3 days if she needed twice a day she can take it but she should not take it more than 2 days in a row because that may harm her kidneys.  The patient agreed to opioid sparing infusion and we are going to schedule her and the order placed today.  Will plan on right SI joint as well     Procedures:  5/19/2025 right sacroiliac joint steroid injection the patient has had a 0% improvement in pain and function.  2/12/2024 right SI joint injection patient has had 90% improvement in pain and function  1/23/2024 left knee  Synvisc injection patient has had 90% improvement in pain function  11/13/2023 left shoulder intra-articular injection under fluoroscopy the patient has had a 0% improvement in pain and function  9/3/2023 left knee Zilretta injection patient has had 0% improvement in pain and function  8/7/2023 right SI injection patient has had 100% improvement in pain and function      Portions of record reviewed for pertinent issues: active problem list, medication list, allergies, family history, social history, notes from last encounter, encounters, lab results, imaging and other available records.      I have personally reviewed the OARRS report for this patient. This report is scanned into the electronic medical record. I have considered the risks of abuse, dependence, addiction and diversion. It showed: No controlled substance  Aberrant behavior: None    Past Medical History  She has a past medical history of Personal history of other diseases of the circulatory system and Personal history of pneumonia (recurrent).    Surgical History  She has a past surgical history that includes Other surgical history (08/07/2019); Other surgical history (08/07/2019); Other surgical history (08/29/2019); Other surgical history (06/09/2019); Other surgical history (06/09/2019); Other surgical history (06/09/2019); Other surgical history (06/09/2019); Other surgical history (06/09/2019); and Other surgical history (06/09/2019).     Social History  She reports that she has never smoked. She has never used smokeless tobacco. She reports current alcohol use. She reports that she does not currently use drugs.    Family History  Family History[1]     Allergies  Patient has no known allergies.    Review of Systems  Review of Systems   Constitutional:  Negative for chills and fatigue.   Respiratory:  Positive for shortness of breath. Negative for chest tightness and wheezing.         SOB with exertion    Cardiovascular:  Negative for chest pain and  palpitations.   Gastrointestinal:  Negative for constipation, diarrhea and nausea.   Genitourinary:  Positive for frequency and urgency.   Musculoskeletal:  Positive for arthralgias, back pain and myalgias.        Chronic lower back right buttock radiates to posterior thigh.  Bilateral knee pain left worse than right.    Neurological:  Positive for numbness. Negative for dizziness.        Pain in right buttocks to posterior thigh   Psychiatric/Behavioral:  Negative for agitation, confusion and suicidal ideas.         Physical Exam  Physical Exam  Constitutional:       General: She is not in acute distress.     Appearance: She is obese.          Comments: Exam limited due to body habitus lumbar decreased range of motion flexion extension, lateral bending.  Tenderness to bilateral sacroiliac notch right side worse than left.  Right lumbar paraspinals and gluteal region tender to palpation, bilateral sit slump test negative.  Bilateral lower extremity vibratory sensation intact, diminished lower extremity reflexes.  He denies any bowel incontinence, or saddle paresthesia.   HENT:      Head: Normocephalic.   Musculoskeletal:      Lumbar back: Tenderness and bony tenderness present. Decreased range of motion.   Neurological:      General: No focal deficit present.      Mental Status: She is alert and oriented to person, place, and time.      GCS: GCS eye subscore is 4. GCS verbal subscore is 5. GCS motor subscore is 6.      Cranial Nerves: Cranial nerves 2-12 are intact.      Sensory: Sensation is intact.      Motor: Motor function is intact.      Coordination: Coordination is intact. Coordination normal.      Gait: Gait is intact. Gait normal.      Deep Tendon Reflexes:      Reflex Scores:       Patellar reflexes are 2+ on the right side and 2+ on the left side.       Achilles reflexes are 2+ on the right side and 2+ on the left side.  Psychiatric:         Attention and Perception: Attention normal.         Mood and  Affect: Mood normal.         Speech: Speech normal.         Behavior: Behavior normal. Behavior is cooperative.         Thought Content: Thought content normal.         Cognition and Memory: Cognition normal.         Judgment: Judgment normal.           Last Recorded Vitals  Blood pressure 127/63, pulse 81, temperature 37.2 °C (99 °F), resp. rate 16, SpO2 92%.    Reviewed Images  7/21/2023 x-ray lumbar spine  FINDINGS:  Lumbar spine, 6 views     There is severe facet disease in the lower lumbar spine. There is  severe spondylosis as well with disc space narrowing osteophytosis at  L5-S1 with moderate changes in the remainder of the lumbar spine. No  fracture or spondylolisthesis seen.      Impression   Severe spondylosis and facet disease at L5-S1 with moderate changes  in the remainder of the lumbar spine     7/21/2023 x-ray pelvis and right hip  FINDINGS:  Pelvis and right hip, three views     There is no fracture. There is no dislocation. There is moderate  bilateral joint space narrowing with fat os the hips. There is  moderate narrowing with osteophytosis and pubic symphysis.      Impression   Moderate right hip osteoarthritis. Similar findings in the left hip  and the pubic symphysi       Reviewed Labs  Lab Results   Component Value Date    GLUCOSE 89 04/24/2025    CALCIUM 10.2 04/24/2025     04/24/2025    K 4.9 04/24/2025    CO2 26 04/24/2025     04/24/2025    BUN 24 04/24/2025    CREATININE 0.74 04/24/2025         Assessment/Plan     Marcella Muñoz is a 69 y.o. female recalled past medical history of morbid obesity, BMI 68, hypertension, seronegative arthropathy with multiple sites started on methylprednisone by , on Plaquenil and Celebrex, who is here for follow-up right sacroiliac joint steroid injection.  The sacroiliac joint steroid injection provided 0 relief of lower back pain and right buttock pain radiating to posterior thigh.  She continues to have increasing widespread  muscle and joint pain (shoulders wrists, knees left side worse than right) and lower back pain radiating to right posterior thigh.  She has completed 2 sessions of IV infusion therapy with minimal symptom relief at this time.  Plan to continue with IV infusion therapy for 6 sessions and will revaluate. She is very deconditioned and gets short of breath with standing and sitting.  Previous lumbar x-ray shows severe spondylosis and facet disease at L5-S1.  Her physical exam is very limited lumbar exam due to body habitus.  She has lumbar decreased range of motion, pain on palpation to right lumbar paraspinals right gluteal region, right sacroiliac notch tender to palpation, right sit slump test positive.  Will plan for open lumbar MRI for further evaluation and possibly fluoroscopy guided epidural steroid injection.  I recommend aqua therapy to improve her strength flexibility range of motion however patient refuses to get in the water.  Provided orders for in-home physical therapy to build her strength and stamina with the goal of getting to aqua therapy and being able to change close without needing assistance from others.  Encourage patient to create a food journal to evaluate her daily food intake.  I provided a order for hemoglobin A1c last drawn over 2 years ago.  She may be an excellent candidate for weight loss medication.       Plan  At least 50% of the visit was involved in the discussion of the options for treatment. We discussed exercises, medication, interventional therapies and surgery. Healthy life style is essential with patient hard work to achieve the wellness. In addition; discussion with the patient and/or family about any of the diagnostic results, impressions and/or recommended diagnostic studies, prognosis, risks and benefits of treatment options, instructions for treatment and/or follow-up, importance of compliance with chosen treatment options, risk-factor reduction, and patient/family  education.     Strongly encouraged Pool therapy, walking in the pool, at least 3x per week for 30 minutes.  Provided order for open MRI lumbar spine due to increased lower back pain radiating to right buttocks posterior thigh.  Healthy lifestyle and anti-inflammatory diet in addition to weight control discussed with the patient.  Provided referral for in-home physical therapy  Provided orders for hemoglobin A1c, follow-up with PCP or endocrinology pending results may be candidate for weight loss medication.  Alternative chronic pain therapies was discussed, encouraged and information was handed  Return to Clinic once lumbar MRI spine complete     *Please note this report has been produced using speech recognition software and may contain errors related to that system including grammar, punctuation and spelling as well as words and phrases that may be inappropriate. If there are questions or concerns, please feel free to contact me to clarify.      I spent 54 minutes in the professional and overall care of this patient.       Miguel Reed, APRN-CNP                [1]   Family History  Problem Relation Name Age of Onset    Diabetes Mother      Other (cardiac disorder) Father      Lung disease Father

## 2025-07-18 ENCOUNTER — INFUSION (OUTPATIENT)
Dept: INFUSION THERAPY | Facility: CLINIC | Age: 70
End: 2025-07-18
Payer: COMMERCIAL

## 2025-07-18 VITALS
RESPIRATION RATE: 24 BRPM | SYSTOLIC BLOOD PRESSURE: 165 MMHG | DIASTOLIC BLOOD PRESSURE: 73 MMHG | OXYGEN SATURATION: 98 % | TEMPERATURE: 97.3 F | HEART RATE: 63 BPM

## 2025-07-18 DIAGNOSIS — M79.7 FIBROMYALGIA: ICD-10-CM

## 2025-07-18 PROCEDURE — 96368 THER/DIAG CONCURRENT INF: CPT | Mod: INF

## 2025-07-18 PROCEDURE — 96375 TX/PRO/DX INJ NEW DRUG ADDON: CPT | Mod: INF

## 2025-07-18 PROCEDURE — 96365 THER/PROPH/DIAG IV INF INIT: CPT | Mod: INF

## 2025-07-18 PROCEDURE — 2500000004 HC RX 250 GENERAL PHARMACY W/ HCPCS (ALT 636 FOR OP/ED): Performed by: NURSE PRACTITIONER

## 2025-07-18 RX ORDER — FAMOTIDINE 10 MG/ML
20 INJECTION, SOLUTION INTRAVENOUS ONCE AS NEEDED
OUTPATIENT
Start: 2025-08-01

## 2025-07-18 RX ORDER — ALBUTEROL SULFATE 0.83 MG/ML
3 SOLUTION RESPIRATORY (INHALATION) AS NEEDED
OUTPATIENT
Start: 2025-08-01

## 2025-07-18 RX ORDER — DIPHENHYDRAMINE HYDROCHLORIDE 50 MG/ML
50 INJECTION, SOLUTION INTRAMUSCULAR; INTRAVENOUS AS NEEDED
OUTPATIENT
Start: 2025-08-01

## 2025-07-18 RX ORDER — KETOROLAC TROMETHAMINE 30 MG/ML
30 INJECTION, SOLUTION INTRAMUSCULAR; INTRAVENOUS ONCE
Status: COMPLETED | OUTPATIENT
Start: 2025-07-18 | End: 2025-07-18

## 2025-07-18 RX ORDER — EPINEPHRINE 1 MG/ML
0.3 INJECTION, SOLUTION, CONCENTRATE INTRAVENOUS EVERY 5 MIN PRN
OUTPATIENT
Start: 2025-08-01

## 2025-07-18 RX ORDER — KETOROLAC TROMETHAMINE 30 MG/ML
30 INJECTION, SOLUTION INTRAMUSCULAR; INTRAVENOUS ONCE
OUTPATIENT
Start: 2025-08-01 | End: 2025-08-01

## 2025-07-18 RX ADMIN — KETOROLAC TROMETHAMINE 30 MG: 30 INJECTION, SOLUTION INTRAMUSCULAR at 13:16

## 2025-07-18 RX ADMIN — PROPOFOL 100 MG: 10 INJECTION, EMULSION INTRAVENOUS at 13:16

## 2025-07-18 RX ADMIN — Medication: at 13:16

## 2025-07-18 ASSESSMENT — PAIN SCALES - GENERAL
PAINLEVEL_OUTOF10: 6
PAINLEVEL_OUTOF10: 10-WORST PAIN EVER
PAINLEVEL_OUTOF10: 10 - WORST POSSIBLE PAIN

## 2025-07-18 ASSESSMENT — PAIN DESCRIPTION - DESCRIPTORS: DESCRIPTORS: THROBBING;SHARP

## 2025-07-18 ASSESSMENT — ENCOUNTER SYMPTOMS
DEPRESSION: 0
LOSS OF SENSATION IN FEET: 0
OCCASIONAL FEELINGS OF UNSTEADINESS: 1

## 2025-07-18 ASSESSMENT — PAIN - FUNCTIONAL ASSESSMENT
PAIN_FUNCTIONAL_ASSESSMENT: 0-10
PAIN_FUNCTIONAL_ASSESSMENT: 0-10

## 2025-07-18 NOTE — PROGRESS NOTES
S: Patient here for 3 opioid sparing pain infusion. Patient reports 0% reduction in pain after last infusion that lasted 0 days.    Purpose of pain infusion meds explained along with potential side effects.  Patient verbalized understanding.    B: Pain Issues 10/10 generalized. Is Patient breast feeding: No      Is Patient receiving any other form Ketamine from any other facility/ outside clinic ?: NO    A: Patient currently has pain described on flow sheet documentation. Designated  is Derrek. Patient last ate solid food 4 hours ago, and had liquid 1 hours ago.    R: Plan; Obtain IV access, do patient risk assessment, and start opioid sparing infusion as ordered. Monitoring for S/S of adverse reactions.    Post infusion teaching provided. Patient verbalized understanding. VSS, Patient states pain is 6/10 generalized. Will assist patient to waiting car via wheelchair.

## 2025-07-18 NOTE — PATIENT INSTRUCTIONS
Today :We administered (ketamine 30 mg, lidocaine (Xylocaine) 300 mg in sodium chloride 0.9% 518 mL IV), propofol (Diprivan) 100 mg in dextrose 5% 25 mL IV, and ketorolac.     For:   1. Fibromyalgia         Your next appointment is due in:  8/6/2025        Please read the  Medication Guide that was given to you and reviewed during todays visit.     (Tell all doctors including dentists that you are taking this medication)     Go to the emergency room or call 911 if:  -You have signs of allergic reaction:   -Rash, hives, itching.   -Swollen, blistered, peeling skin.   -Swelling of face, lips, mouth, tongue or throat.   -Tightness of chest, trouble breathing, swallowing or talking     Call your doctor:  - If IV / injection site gets red, warm, swollen, itchy or leaks fluid or pus.     (Leave dressing on your IV site for at least 2 hours and keep area clean and dry  - If you get sick or have symptoms of infection or are not feeling well for any reason.    (Wash your hands often, stay away from people who are sick)  - If you have side effects from your medication that do not go away or are bothersome.     (Refer to the teaching your nurse gave you for side effects to call your doctor about)    - Common side effects may include:  stuffy nose, headache, feeling tired, muscle aches, upset stomach  - Before receiving any vaccines     - Call [facility] at   If:  - You get sick, are on antibiotics, have had a recent vaccine, have surgery or dental work and your doctor wants your visit rescheduled.  - You need to cancel and reschedule your visit for any reason. Call at least 2 days before your visit if you need to cancel.   - Your insurance changes before your next visit.    (We will need to get approval from your new insurance. This can take up to two weeks.)     [facility] is opened Monday thru Friday. We are closed on weekends and holidays.   Voice mail will take your call if the  center is closed. If you leave a message please allow 24 hours for a call back during weekdays. If you leave a message on a weekend/holiday, we will call you back the next business day.    A pharmacist is available Monday - Friday from 8:30AM to 3:30PM to help answer any questions you may have about your prescriptions(s). Please call pharmacy at:    [facility]: [phone]  [facility]: [phone]  [facility]: [phone]              [facility]      Pain Infusion Aftercare Instructions      1. It is normal to feel sedated, tired and low in energy after a pain infusion. DO NOT DRIVE, OPERATE ANY MACHINERY, OR MAKE ANY IMPORTANT DECISIONS FOR AT LEAST 24 HOURS AFTER THE INFUSION.     2. Call the pain center at [phone] with any problems, questions, or concerns.     3. Eat light after the infusion. If you feel queasy or sick to your stomach, laying down with your eyes closed may help. When you resume eating start with something mild like clear liquids, yogurt, applesauce, crackers, etc… Gradually advance to a regular diet.     4. Do not leave your house alone the evening of your pain infusion.     5. No alcohol or sedative medications, such as sleeping pills, for 24 hours after your pain infusion.     6. Resume all other prescribed medications unless directed otherwise by you physician.     7. If you have any medical emergencies, call 911 or go directly to the closest emergency room.    Patient Instructions  IV Infusion   [facility]      1. A responsible adult must stay with you during your infusion and drive you home. If you do not have a responsible adult with transportation home, your appointment will be rescheduled. Patients must still have a responsible adult if they use [employer]ides[employer]are, Uber, or Lyft. Uber, Rides[employer], or Lyft drivers do not qualify.   2. On the day of your infusion we ask that you please drink clear liquids until  your appointment.  You should NOT eat food 4 hours before your infusion.    3. Take all medications as prescribed before your infusion. Do not withhold blood pressure medication.   4. Patients who use a CPAP or BIPAP should bring it to the infusion appointment.   5. Children under 16 will not be permitted in the infusion clinic. Please do not bring young children or pets. For patients who must bring a service animal, paperwork will be required. NO EXCEPTIONS.  6.  All Women will be required to take a pregnancy test prior to the infusion unless they are above 55 years of age or have had a hysterectomy.   7. Patients who cancel their infusion should call the Infusion line at [phone] as soon as possible. We would appreciate a 48-hour notice. Please be on time for the appt. Patients who are more than 15 mins late will have to cancel and reschedule. Infusion appt times are minimal. Patients who do not show, cancel, or reschedule an appointment may have a long wait until we can get them back on the schedule.   8. We make every effort to schedule your infusions based on your physician's recommendations. However, factors will affect our infusion schedule and availability. We appreciate your understanding.  9. Appointments will only be scheduled for two appointments in the future.   10. No eating, drinking, or chewing gum during the infusion.   11. If you miss or cancel your infusion appointment it is YOUR responsibility to call us and reschedule.  Please call [phone] or [phone] to reschedule or cancel appointment

## 2025-07-25 ENCOUNTER — HOSPITAL ENCOUNTER (OUTPATIENT)
Dept: RADIOLOGY | Facility: CLINIC | Age: 70
Discharge: HOME | End: 2025-07-25
Payer: COMMERCIAL

## 2025-07-25 DIAGNOSIS — M43.06 LUMBAR SPONDYLOLYSIS: ICD-10-CM

## 2025-07-25 DIAGNOSIS — M79.7 FIBROMYALGIA: ICD-10-CM

## 2025-07-25 DIAGNOSIS — M54.16 LUMBAR RADICULOPATHY, RIGHT: ICD-10-CM

## 2025-07-25 DIAGNOSIS — E66.01 MORBID OBESITY WITH BMI OF 60.0-69.9, ADULT (MULTI): ICD-10-CM

## 2025-07-25 PROCEDURE — 72148 MRI LUMBAR SPINE W/O DYE: CPT

## 2025-07-31 DIAGNOSIS — M79.7 FIBROMYALGIA: Primary | ICD-10-CM

## 2025-07-31 RX ORDER — KETOROLAC TROMETHAMINE 30 MG/ML
30 INJECTION, SOLUTION INTRAMUSCULAR; INTRAVENOUS ONCE
OUTPATIENT
Start: 2025-08-06 | End: 2025-08-06

## 2025-08-06 ENCOUNTER — INFUSION (OUTPATIENT)
Dept: INFUSION THERAPY | Facility: CLINIC | Age: 70
End: 2025-08-06
Payer: COMMERCIAL

## 2025-08-06 VITALS
SYSTOLIC BLOOD PRESSURE: 116 MMHG | DIASTOLIC BLOOD PRESSURE: 59 MMHG | OXYGEN SATURATION: 98 % | RESPIRATION RATE: 22 BRPM | HEART RATE: 79 BPM | TEMPERATURE: 97.3 F

## 2025-08-06 DIAGNOSIS — M79.7 FIBROMYALGIA: ICD-10-CM

## 2025-08-06 PROCEDURE — 96375 TX/PRO/DX INJ NEW DRUG ADDON: CPT | Mod: INF

## 2025-08-06 PROCEDURE — 96368 THER/DIAG CONCURRENT INF: CPT | Mod: INF

## 2025-08-06 PROCEDURE — 2500000004 HC RX 250 GENERAL PHARMACY W/ HCPCS (ALT 636 FOR OP/ED): Mod: JZ | Performed by: NURSE PRACTITIONER

## 2025-08-06 PROCEDURE — 96365 THER/PROPH/DIAG IV INF INIT: CPT | Mod: INF

## 2025-08-06 RX ORDER — ALBUTEROL SULFATE 0.83 MG/ML
3 SOLUTION RESPIRATORY (INHALATION) AS NEEDED
OUTPATIENT
Start: 2025-08-20

## 2025-08-06 RX ORDER — EPINEPHRINE 1 MG/ML
0.3 INJECTION, SOLUTION, CONCENTRATE INTRAVENOUS EVERY 5 MIN PRN
OUTPATIENT
Start: 2025-08-20

## 2025-08-06 RX ORDER — KETOROLAC TROMETHAMINE 30 MG/ML
30 INJECTION, SOLUTION INTRAMUSCULAR; INTRAVENOUS ONCE
OUTPATIENT
Start: 2025-08-20 | End: 2025-08-20

## 2025-08-06 RX ORDER — FAMOTIDINE 10 MG/ML
20 INJECTION, SOLUTION INTRAVENOUS ONCE AS NEEDED
OUTPATIENT
Start: 2025-08-20

## 2025-08-06 RX ORDER — KETOROLAC TROMETHAMINE 30 MG/ML
30 INJECTION, SOLUTION INTRAMUSCULAR; INTRAVENOUS ONCE
Status: COMPLETED | OUTPATIENT
Start: 2025-08-06 | End: 2025-08-06

## 2025-08-06 RX ORDER — DIPHENHYDRAMINE HYDROCHLORIDE 50 MG/ML
50 INJECTION, SOLUTION INTRAMUSCULAR; INTRAVENOUS AS NEEDED
OUTPATIENT
Start: 2025-08-20

## 2025-08-06 RX ADMIN — PROPOFOL 100 MG: 10 INJECTION, EMULSION INTRAVENOUS at 14:10

## 2025-08-06 RX ADMIN — Medication: at 14:10

## 2025-08-06 RX ADMIN — KETOROLAC TROMETHAMINE 30 MG: 30 INJECTION, SOLUTION INTRAMUSCULAR at 14:09

## 2025-08-06 ASSESSMENT — PAIN - FUNCTIONAL ASSESSMENT
PAIN_FUNCTIONAL_ASSESSMENT: 0-10
PAIN_FUNCTIONAL_ASSESSMENT: 0-10

## 2025-08-06 ASSESSMENT — PAIN SCALES - GENERAL
PAINLEVEL_OUTOF10: 3
PAINLEVEL_OUTOF10: 7

## 2025-08-06 ASSESSMENT — PAIN DESCRIPTION - DESCRIPTORS: DESCRIPTORS: THROBBING;SHARP

## 2025-08-06 ASSESSMENT — ENCOUNTER SYMPTOMS
LOSS OF SENSATION IN FEET: 0
OCCASIONAL FEELINGS OF UNSTEADINESS: 1
DEPRESSION: 0

## 2025-08-06 NOTE — PATIENT INSTRUCTIONS
Today :We administered (ketamine 30 mg, lidocaine (Xylocaine) 300 mg in sodium chloride 0.9% 518 mL IV), propofol (Diprivan) 100 mg in dextrose 5% 25 mL IV, and ketorolac.     For:   1. Fibromyalgia         Your next appointment is due in:  See AVS        Please read the  Medication Guide that was given to you and reviewed during todays visit.     (Tell all doctors including dentists that you are taking this medication)     Go to the emergency room or call 911 if:  -You have signs of allergic reaction:   -Rash, hives, itching.   -Swollen, blistered, peeling skin.   -Swelling of face, lips, mouth, tongue or throat.   -Tightness of chest, trouble breathing, swallowing or talking     Call your doctor:  - If IV / injection site gets red, warm, swollen, itchy or leaks fluid or pus.     (Leave dressing on your IV site for at least 2 hours and keep area clean and dry  - If you get sick or have symptoms of infection or are not feeling well for any reason.    (Wash your hands often, stay away from people who are sick)  - If you have side effects from your medication that do not go away or are bothersome.     (Refer to the teaching your nurse gave you for side effects to call your doctor about)    - Common side effects may include:  stuffy nose, headache, feeling tired, muscle aches, upset stomach  - Before receiving any vaccines     - Call [facility] at   If:  - You get sick, are on antibiotics, have had a recent vaccine, have surgery or dental work and your doctor wants your visit rescheduled.  - You need to cancel and reschedule your visit for any reason. Call at least 2 days before your visit if you need to cancel.   - Your insurance changes before your next visit.    (We will need to get approval from your new insurance. This can take up to two weeks.)     [facility] is opened Monday thru Friday. We are closed on weekends and holidays.   Voice mail will take your call if the  center is closed. If you leave a message please allow 24 hours for a call back during weekdays. If you leave a message on a weekend/holiday, we will call you back the next business day.    A pharmacist is available Monday - Friday from 8:30AM to 3:30PM to help answer any questions you may have about your prescriptions(s). Please call pharmacy at:    [facility]: [phone]  [facility]: [phone]  [facility]: [phone]              [facility]      Pain Infusion Aftercare Instructions      1. It is normal to feel sedated, tired and low in energy after a pain infusion. DO NOT DRIVE, OPERATE ANY MACHINERY, OR MAKE ANY IMPORTANT DECISIONS FOR AT LEAST 24 HOURS AFTER THE INFUSION.     2. Call the pain center at [phone] with any problems, questions, or concerns.     3. Eat light after the infusion. If you feel queasy or sick to your stomach, laying down with your eyes closed may help. When you resume eating start with something mild like clear liquids, yogurt, applesauce, crackers, etc… Gradually advance to a regular diet.     4. Do not leave your house alone the evening of your pain infusion.     5. No alcohol or sedative medications, such as sleeping pills, for 24 hours after your pain infusion.     6. Resume all other prescribed medications unless directed otherwise by you physician.     7. If you have any medical emergencies, call 911 or go directly to the closest emergency room.

## 2025-08-06 NOTE — PROGRESS NOTES
S: Patient here for 4 th opioid sparing pain infusion. Patient reports Zero% reduction in pain after last pain infusion.    Purpose of pain infusion meds explained along with potential side effects.  Patient verbalized understanding.    B: Pain Issues. Is Patient breast feeding: N/A      Is Patient receiving any other form Ketamine from any other facility/ outside clinic ?: NO  A: Patient currently has pain described on flow sheet documentation. Designated  is Arturoduyen Muñoz [phone]. Patient last ate solid food >8 hours ago, and had liquid >2 hours ago.    R: Plan; Obtain IV access, do patient risk assessment, and start opioid sparing infusion as ordered. Monitoring for S/S of adverse reactions.    1430: Patient is restful as of this time, tolerating pain infusion well. Continuing to monitor.    1455: Post infusion teaching provided. Patient verbalized understanding. VSS, Patient states pain is slightly less. Will assist patient to waiting car via wheelchair.

## 2025-08-15 ENCOUNTER — TELEPHONE (OUTPATIENT)
Dept: PAIN MEDICINE | Facility: CLINIC | Age: 70
End: 2025-08-15
Payer: COMMERCIAL

## 2025-08-21 DIAGNOSIS — M79.7 FIBROMYALGIA: Primary | ICD-10-CM

## 2025-08-21 RX ORDER — KETOROLAC TROMETHAMINE 30 MG/ML
30 INJECTION, SOLUTION INTRAMUSCULAR; INTRAVENOUS ONCE
OUTPATIENT
Start: 2025-08-25 | End: 2025-08-25

## 2025-08-25 ENCOUNTER — INFUSION (OUTPATIENT)
Dept: INFUSION THERAPY | Facility: CLINIC | Age: 70
End: 2025-08-25
Payer: COMMERCIAL

## 2025-08-25 VITALS
SYSTOLIC BLOOD PRESSURE: 135 MMHG | DIASTOLIC BLOOD PRESSURE: 75 MMHG | TEMPERATURE: 97.5 F | RESPIRATION RATE: 19 BRPM | OXYGEN SATURATION: 98 % | HEART RATE: 62 BPM

## 2025-08-25 DIAGNOSIS — M79.7 FIBROMYALGIA: ICD-10-CM

## 2025-08-25 PROCEDURE — 2500000004 HC RX 250 GENERAL PHARMACY W/ HCPCS (ALT 636 FOR OP/ED): Performed by: NURSE PRACTITIONER

## 2025-08-25 PROCEDURE — 96368 THER/DIAG CONCURRENT INF: CPT | Mod: INF

## 2025-08-25 PROCEDURE — 96365 THER/PROPH/DIAG IV INF INIT: CPT | Mod: INF

## 2025-08-25 PROCEDURE — 96375 TX/PRO/DX INJ NEW DRUG ADDON: CPT | Mod: INF

## 2025-08-25 RX ORDER — EPINEPHRINE 1 MG/ML
0.3 INJECTION, SOLUTION, CONCENTRATE INTRAVENOUS EVERY 5 MIN PRN
OUTPATIENT
Start: 2025-09-08

## 2025-08-25 RX ORDER — KETOROLAC TROMETHAMINE 30 MG/ML
30 INJECTION, SOLUTION INTRAMUSCULAR; INTRAVENOUS ONCE
Status: COMPLETED | OUTPATIENT
Start: 2025-08-25 | End: 2025-08-25

## 2025-08-25 RX ORDER — FAMOTIDINE 10 MG/ML
20 INJECTION, SOLUTION INTRAVENOUS ONCE AS NEEDED
OUTPATIENT
Start: 2025-09-08

## 2025-08-25 RX ORDER — ALBUTEROL SULFATE 0.83 MG/ML
3 SOLUTION RESPIRATORY (INHALATION) AS NEEDED
OUTPATIENT
Start: 2025-09-08

## 2025-08-25 RX ORDER — DIPHENHYDRAMINE HYDROCHLORIDE 50 MG/ML
50 INJECTION, SOLUTION INTRAMUSCULAR; INTRAVENOUS AS NEEDED
OUTPATIENT
Start: 2025-09-08

## 2025-08-25 RX ORDER — KETOROLAC TROMETHAMINE 30 MG/ML
30 INJECTION, SOLUTION INTRAMUSCULAR; INTRAVENOUS ONCE
OUTPATIENT
Start: 2025-09-08 | End: 2025-09-08

## 2025-08-25 RX ADMIN — KETOROLAC TROMETHAMINE 30 MG: 30 INJECTION, SOLUTION INTRAMUSCULAR at 13:21

## 2025-08-25 RX ADMIN — Medication: at 13:24

## 2025-08-25 RX ADMIN — PROPOFOL 100 MG: 10 INJECTION, EMULSION INTRAVENOUS at 13:24

## 2025-08-25 ASSESSMENT — ENCOUNTER SYMPTOMS
OCCASIONAL FEELINGS OF UNSTEADINESS: 1
DEPRESSION: 0
LOSS OF SENSATION IN FEET: 0

## 2025-08-25 ASSESSMENT — PAIN DESCRIPTION - DESCRIPTORS: DESCRIPTORS: SHARP

## 2025-08-25 ASSESSMENT — PAIN - FUNCTIONAL ASSESSMENT
PAIN_FUNCTIONAL_ASSESSMENT: 0-10
PAIN_FUNCTIONAL_ASSESSMENT: 0-10

## 2025-08-25 ASSESSMENT — PAIN SCALES - GENERAL
PAINLEVEL_OUTOF10: 8
PAINLEVEL_OUTOF10: 4
PAINLEVEL_OUTOF10: 8

## 2025-09-05 LAB
EST. AVERAGE GLUCOSE BLD GHB EST-MCNC: 97 MG/DL
EST. AVERAGE GLUCOSE BLD GHB EST-SCNC: 5.4 MMOL/L
HBA1C MFR BLD: 5 %

## 2025-10-23 ENCOUNTER — APPOINTMENT (OUTPATIENT)
Dept: PRIMARY CARE | Facility: CLINIC | Age: 70
End: 2025-10-23
Payer: COMMERCIAL

## 2025-11-04 ENCOUNTER — APPOINTMENT (OUTPATIENT)
Dept: RHEUMATOLOGY | Facility: CLINIC | Age: 70
End: 2025-11-04
Payer: COMMERCIAL

## 2025-11-12 ENCOUNTER — APPOINTMENT (OUTPATIENT)
Dept: RHEUMATOLOGY | Facility: CLINIC | Age: 70
End: 2025-11-12
Payer: COMMERCIAL